# Patient Record
Sex: FEMALE | Race: WHITE | NOT HISPANIC OR LATINO | Employment: UNEMPLOYED | URBAN - METROPOLITAN AREA
[De-identification: names, ages, dates, MRNs, and addresses within clinical notes are randomized per-mention and may not be internally consistent; named-entity substitution may affect disease eponyms.]

---

## 2020-03-05 ENCOUNTER — HOSPITAL ENCOUNTER (EMERGENCY)
Facility: HOSPITAL | Age: 35
Discharge: HOME | End: 2020-03-05
Attending: EMERGENCY MEDICINE | Admitting: EMERGENCY MEDICINE

## 2020-03-05 ENCOUNTER — TELEPHONE (OUTPATIENT)
Dept: OBGYN CLINIC | Facility: MEDICAL CENTER | Age: 35
End: 2020-03-05

## 2020-03-05 ENCOUNTER — APPOINTMENT (OUTPATIENT)
Dept: LAB | Facility: HOSPITAL | Age: 35
End: 2020-03-05
Attending: OBSTETRICS & GYNECOLOGY
Payer: COMMERCIAL

## 2020-03-05 ENCOUNTER — OFFICE VISIT (OUTPATIENT)
Dept: FAMILY MEDICINE CLINIC | Facility: CLINIC | Age: 35
End: 2020-03-05

## 2020-03-05 VITALS
WEIGHT: 107 LBS | HEIGHT: 58 IN | OXYGEN SATURATION: 99 % | DIASTOLIC BLOOD PRESSURE: 60 MMHG | SYSTOLIC BLOOD PRESSURE: 100 MMHG | HEART RATE: 118 BPM | BODY MASS INDEX: 22.46 KG/M2 | TEMPERATURE: 99 F

## 2020-03-05 VITALS
BODY MASS INDEX: 22.49 KG/M2 | WEIGHT: 107.14 LBS | DIASTOLIC BLOOD PRESSURE: 65 MMHG | HEART RATE: 71 BPM | OXYGEN SATURATION: 98 % | TEMPERATURE: 98 F | SYSTOLIC BLOOD PRESSURE: 110 MMHG | RESPIRATION RATE: 18 BRPM

## 2020-03-05 DIAGNOSIS — N93.9 VAGINAL BLEEDING: Primary | ICD-10-CM

## 2020-03-05 DIAGNOSIS — Z32.01 POSITIVE PREGNANCY TEST: Primary | ICD-10-CM

## 2020-03-05 DIAGNOSIS — Z32.01 POSITIVE PREGNANCY TEST: ICD-10-CM

## 2020-03-05 DIAGNOSIS — Z32.00 POSSIBLE PREGNANCY, NOT CONFIRMED: ICD-10-CM

## 2020-03-05 PROBLEM — E03.9 ACQUIRED HYPOTHYROIDISM: Status: ACTIVE | Noted: 2020-03-05

## 2020-03-05 LAB
ABO GROUP BLD: NORMAL
AMORPH URATE CRY URNS QL MICRO: ABNORMAL /HPF
B-HCG SERPL-ACNC: <2 MIU/ML
B-HCG SERPL-ACNC: <2 MIU/ML
BACTERIA UR QL AUTO: ABNORMAL /HPF
BASOPHILS # BLD AUTO: 0.14 THOUSANDS/ΜL (ref 0–0.1)
BASOPHILS # BLD AUTO: 0.17 THOUSANDS/ΜL (ref 0–0.1)
BASOPHILS NFR BLD AUTO: 2 % (ref 0–1)
BASOPHILS NFR BLD AUTO: 2 % (ref 0–1)
BILIRUB UR QL STRIP: NEGATIVE
BLD GP AB SCN SERPL QL: NEGATIVE
CLARITY UR: ABNORMAL
COLOR UR: ABNORMAL
EOSINOPHIL # BLD AUTO: 0.76 THOUSAND/ΜL (ref 0–0.61)
EOSINOPHIL # BLD AUTO: 0.81 THOUSAND/ΜL (ref 0–0.61)
EOSINOPHIL NFR BLD AUTO: 8 % (ref 0–6)
EOSINOPHIL NFR BLD AUTO: 9 % (ref 0–6)
ERYTHROCYTE [DISTWIDTH] IN BLOOD BY AUTOMATED COUNT: 13.5 % (ref 11.6–15.1)
ERYTHROCYTE [DISTWIDTH] IN BLOOD BY AUTOMATED COUNT: 13.7 % (ref 11.6–15.1)
GLUCOSE UR STRIP-MCNC: NEGATIVE MG/DL
HCT VFR BLD AUTO: 41.5 % (ref 34.8–46.1)
HCT VFR BLD AUTO: 44.9 % (ref 34.8–46.1)
HGB BLD-MCNC: 13.6 G/DL (ref 11.5–15.4)
HGB BLD-MCNC: 14.6 G/DL (ref 11.5–15.4)
HGB UR QL STRIP.AUTO: ABNORMAL
IMM GRANULOCYTES # BLD AUTO: 0.01 THOUSAND/UL (ref 0–0.2)
IMM GRANULOCYTES # BLD AUTO: 0.03 THOUSAND/UL (ref 0–0.2)
IMM GRANULOCYTES NFR BLD AUTO: 0 % (ref 0–2)
IMM GRANULOCYTES NFR BLD AUTO: 0 % (ref 0–2)
KETONES UR STRIP-MCNC: NEGATIVE MG/DL
LEUKOCYTE ESTERASE UR QL STRIP: NEGATIVE
LYMPHOCYTES # BLD AUTO: 3.07 THOUSANDS/ΜL (ref 0.6–4.47)
LYMPHOCYTES # BLD AUTO: 4.15 THOUSANDS/ΜL (ref 0.6–4.47)
LYMPHOCYTES NFR BLD AUTO: 36 % (ref 14–44)
LYMPHOCYTES NFR BLD AUTO: 39 % (ref 14–44)
MCH RBC QN AUTO: 32.7 PG (ref 26.8–34.3)
MCH RBC QN AUTO: 32.7 PG (ref 26.8–34.3)
MCHC RBC AUTO-ENTMCNC: 32.5 G/DL (ref 31.4–37.4)
MCHC RBC AUTO-ENTMCNC: 32.8 G/DL (ref 31.4–37.4)
MCV RBC AUTO: 100 FL (ref 82–98)
MCV RBC AUTO: 101 FL (ref 82–98)
MONOCYTES # BLD AUTO: 0.56 THOUSAND/ΜL (ref 0.17–1.22)
MONOCYTES # BLD AUTO: 0.62 THOUSAND/ΜL (ref 0.17–1.22)
MONOCYTES NFR BLD AUTO: 6 % (ref 4–12)
MONOCYTES NFR BLD AUTO: 7 % (ref 4–12)
NEUTROPHILS # BLD AUTO: 3.99 THOUSANDS/ΜL (ref 1.85–7.62)
NEUTROPHILS # BLD AUTO: 4.92 THOUSANDS/ΜL (ref 1.85–7.62)
NEUTS SEG NFR BLD AUTO: 45 % (ref 43–75)
NEUTS SEG NFR BLD AUTO: 46 % (ref 43–75)
NITRITE UR QL STRIP: NEGATIVE
NON-SQ EPI CELLS URNS QL MICRO: ABNORMAL /HPF
NRBC BLD AUTO-RTO: 0 /100 WBCS
NRBC BLD AUTO-RTO: 0 /100 WBCS
PH UR STRIP.AUTO: 6 [PH] (ref 4.5–8)
PLATELET # BLD AUTO: 398 THOUSANDS/UL (ref 149–390)
PLATELET # BLD AUTO: 442 THOUSANDS/UL (ref 149–390)
PMV BLD AUTO: 9.1 FL (ref 8.9–12.7)
PMV BLD AUTO: 9.3 FL (ref 8.9–12.7)
PROT UR STRIP-MCNC: ABNORMAL MG/DL
RBC # BLD AUTO: 4.16 MILLION/UL (ref 3.81–5.12)
RBC # BLD AUTO: 4.46 MILLION/UL (ref 3.81–5.12)
RBC #/AREA URNS AUTO: ABNORMAL /HPF
RH BLD: POSITIVE
SL AMB POCT URINE HCG: NEGATIVE
SP GR UR STRIP.AUTO: 1.01 (ref 1–1.03)
SPECIMEN EXPIRATION DATE: NORMAL
UROBILINOGEN UR QL STRIP.AUTO: 0.2 E.U./DL
WBC # BLD AUTO: 10.7 THOUSAND/UL (ref 4.31–10.16)
WBC # BLD AUTO: 8.53 THOUSAND/UL (ref 4.31–10.16)
WBC #/AREA URNS AUTO: ABNORMAL /HPF

## 2020-03-05 PROCEDURE — 99284 EMERGENCY DEPT VISIT MOD MDM: CPT

## 2020-03-05 PROCEDURE — 4004F PT TOBACCO SCREEN RCVD TLK: CPT | Performed by: NURSE PRACTITIONER

## 2020-03-05 PROCEDURE — 99203 OFFICE O/P NEW LOW 30 MIN: CPT | Performed by: NURSE PRACTITIONER

## 2020-03-05 PROCEDURE — 85025 COMPLETE CBC W/AUTO DIFF WBC: CPT

## 2020-03-05 PROCEDURE — 99283 EMERGENCY DEPT VISIT LOW MDM: CPT | Performed by: EMERGENCY MEDICINE

## 2020-03-05 PROCEDURE — 84702 CHORIONIC GONADOTROPIN TEST: CPT | Performed by: EMERGENCY MEDICINE

## 2020-03-05 PROCEDURE — 85025 COMPLETE CBC W/AUTO DIFF WBC: CPT | Performed by: EMERGENCY MEDICINE

## 2020-03-05 PROCEDURE — 36415 COLL VENOUS BLD VENIPUNCTURE: CPT | Performed by: EMERGENCY MEDICINE

## 2020-03-05 PROCEDURE — 84702 CHORIONIC GONADOTROPIN TEST: CPT

## 2020-03-05 PROCEDURE — 86850 RBC ANTIBODY SCREEN: CPT

## 2020-03-05 PROCEDURE — 81025 URINE PREGNANCY TEST: CPT | Performed by: NURSE PRACTITIONER

## 2020-03-05 PROCEDURE — 3008F BODY MASS INDEX DOCD: CPT | Performed by: NURSE PRACTITIONER

## 2020-03-05 PROCEDURE — 86901 BLOOD TYPING SEROLOGIC RH(D): CPT

## 2020-03-05 PROCEDURE — 81001 URINALYSIS AUTO W/SCOPE: CPT

## 2020-03-05 PROCEDURE — 86900 BLOOD TYPING SEROLOGIC ABO: CPT

## 2020-03-05 NOTE — ED PROVIDER NOTES
History  Chief Complaint   Patient presents with    Vaginal Bleeding - Pregnant     Pt reports she is 7 weeks pregnant and c/o vaginal bleeding ongoing 2 days; denies cramping; Pt reports she is going throut one pad every 8 hours     A 28 yo female who is  at [address] by LMP (2019); presents with vaginal bleeding that began 2 days ago  Patient describes the vaginal bleeding as light, going through a menstrual pad every 8 hours  Patient denies passing blood clots or tissue  Patient denies associated abdominal pain or cramping  Patient otherwise denies fever, chills, chest pain, shortness of breath, nausea, vomiting, diarrhea, peripheral edema and rashes  Patient was seen at [facility] ED on 2020 after having a missed period, and was found to have a positive pregnancy test   Patient's quant HCG was 193 at that time however and ultrasound did not identify an IUP  Patient was instructed to have repeat lab work however did not have a completed secondary to not having an OBGYN  Patient presented to her family physician today given the bleeding, where a urine pregnancy test was negative  Patient was sent to the ED for further evaluation  Patient does have an appointment with OB tomorrow  A/P:  Vaginal bleeding, with a recent positive pregnancy test at [facility] ED last month  Patient resting comfortably no acute distress  Will check lab work for hCG levels and anemia  On review, patient is Rh positive  If patient's quant hCG is positive, will proceed with imaging  History provided by:  Patient and medical records    None       Past Medical History:   Diagnosis Date    Disease of thyroid gland        History reviewed  No pertinent surgical history      Family History   Problem Relation Age of Onset    Coronary artery disease Mother     Diabetes Mother     Heart attack Mother     No Known Problems Father     Diabetes Paternal Grandmother     Diabetes Paternal Grandfather      I have reviewed and agree with the history as documented  E-Cigarette/Vaping    E-Cigarette Use Former User      E-Cigarette/Vaping Substances     Social History     Tobacco Use    Smoking status: Current Every Day Smoker     Types: Cigarettes    Smokeless tobacco: Never Used   Substance Use Topics    Alcohol use: Not Currently     Frequency: Never    Drug use: Not Currently       Review of Systems   Genitourinary: Positive for vaginal bleeding  All other systems reviewed and are negative        Physical Exam  Physical Exam  General Appearance: alert and oriented, nad, non toxic appearing  Skin:  Warm, dry, intact  HEENT: atraumatic, normocephalic  Neck: Supple, trachea midline  Cardiac: RRR; no murmurs, rub, gallops  Pulmonary: lungs CTAB; no wheezes, rales, rhonchi  Gastrointestinal: abdomen soft, nontender, nondistended; no guarding or rebound tenderness; good bowel sounds, no mass or bruits  Extremities:  no pedal edema, 2+ pulses; no calf tenderness, no clubbing, no cyanosis  Neuro:  no focal motor or sensory deficits, CN 2-12 grossly intact  Psych:  Normal mood and affect, normal judgement and insight        Vital Signs  ED Triage Vitals   Temperature Pulse Respirations Blood Pressure SpO2   03/05/20 1013 03/05/20 1013 03/05/20 1013 03/05/20 1013 03/05/20 1013   98 °F (36 7 °C) 94 16 140/82 99 %      Temp Source Heart Rate Source Patient Position - Orthostatic VS BP Location FiO2 (%)   03/05/20 1013 03/05/20 1013 03/05/20 1211 03/05/20 1211 --   Oral Monitor Lying Right arm       Pain Score       03/05/20 1013       No Pain           Vitals:    03/05/20 1013 03/05/20 1211   BP: 140/82 110/65   Pulse: 94 71   Patient Position - Orthostatic VS:  Lying         Visual Acuity      ED Medications  Medications - No data to display    Diagnostic Studies  Results Reviewed     Procedure Component Value Units Date/Time    Urine Microscopic [[id number]]  (Abnormal) Collected:  03/05/20 1129    Lab Status:  Final result Specimen:  Urine, Clean Catch Updated:  03/05/20 1203     RBC, UA 4-10 /hpf      WBC, UA 1-2 /hpf      Epithelial Cells Occasional /hpf      Bacteria, UA Moderate /hpf      AMORPH URATES Moderate /hpf     POCT urinalysis dipstick [[id number]]  (Abnormal) Resulted:  03/05/20 1132    Lab Status:  Final result Specimen:  Urine Updated:  03/05/20 1132    Urine Macroscopic, POC [[id number]]  (Abnormal) Collected:  03/05/20 1129    Lab Status:  Final result Specimen:  Urine Updated:  03/05/20 1131     Color, UA Bloody     Clarity, UA Cloudy     pH, UA 6 0     Leukocytes, UA Negative     Nitrite, UA Negative     Protein, UA 30 (1+) mg/dl      Glucose, UA Negative mg/dl      Ketones, UA Negative mg/dl      Urobilinogen, UA 0 2 E U /dl      Bilirubin, UA Negative     Blood, UA Large     Specific [place], UA 1 010    Narrative:       CLINITEK RESULT    Quantitative hCG [[id number]]  (Normal) Collected:  03/05/20 1048    Lab Status:  Final result Specimen:  Blood from Arm, Left Updated:  03/05/20 1123     HCG, Quant <2 mIU/mL     Narrative:        Expected Ranges:     Approximate               Approximate HCG  Gestation age          Concentration ( mIU/mL)  _____________          ______________________   [facility]                      HCG values  0 2-1                       5-50  1-2                           2-3                         100-5000  3-4                         500-17460  4-5                         1000-99706  5-6                         54311-000324  6-8                         30251-360421  8-12                        [id number]      CBC and differential [[id number]]  (Abnormal) Collected:  03/05/20 1048    Lab Status:  Final result Specimen:  Blood from Arm, Left Updated:  03/05/20 1056     WBC 8 53 Thousand/uL      RBC 4 16 Million/uL      Hemoglobin 13 6 g/dL      Hematocrit 41 5 %       fL      MCH 32 7 pg      MCHC 32 8 g/dL      RDW 13 5 %      MPV 9 1 fL      Platelets 629 Thousands/uL      nRBC 0 /100 WBCs Neutrophils Relative 46 %      Immat GRANS % 0 %      Lymphocytes Relative 36 %      Monocytes Relative 7 %      Eosinophils Relative 9 %      Basophils Relative 2 %      Neutrophils Absolute 3 99 Thousands/µL      Immature Grans Absolute 0 01 Thousand/uL      Lymphocytes Absolute 3 07 Thousands/µL      Monocytes Absolute 0 56 Thousand/µL      Eosinophils Absolute 0 76 Thousand/µL      Basophils Absolute 0 14 Thousands/µL                  No orders to display              Procedures  Procedures         ED Course  ED Course as of Mar 05 1457   Thu Mar 05, 2020   1156 Suspect patient had miscarriage at some point since ED visit last month  Will proceed with discharge home, recommending follow up with OB tomorrow as previously scheduled  HCG QUANTITATIVE: <2                               MDM      Disposition  Final diagnoses:   Vaginal bleeding     Time reflects when diagnosis was documented in both MDM as applicable and the Disposition within this note     Time User Action Codes Description Comment    3/5/2020 11:57 AM Owen Michele Add [N93 9] Vaginal bleeding       ED Disposition     ED Disposition Condition Date/Time Comment    Discharge Stable Thu Mar 5, 2020 11:57 AM Sabas Guillen discharge to home/self care  Follow-up Information     Follow up With Specialties Details Why Char Webb MD Obstetrics and Gynecology, Gynecology, Obstetrics Go on 3/6/2020 For re-evaluation [address] [phone]            There are no discharge medications for this patient  No discharge procedures on file      PDMP Review     None          ED Provider  Electronically Signed by           Linda Elias DO  03/05/20 1334

## 2020-03-05 NOTE — PROGRESS NOTES
Assessment/Plan:    No problem-specific Assessment & Plan notes found for this encounter  Diagnoses and all orders for this visit:    Vaginal bleeding  Possible pregnancy, not confirmed  -     POCT urine HCG negative x 2 in office today  Unable to get pt into gyn today, but got her an appt for tomorrow  Sent to ED after this visit for eval today  Follow up with gyn pending ED recommendations  Follow up here in near future for health maintenance  Subjective:      Patient ID: Summer Blanchard is a 29 y o  female  Here to establish care; pt's mother is a patient here  Pt has been spotting x 2 days; says "it took a day and half" to soil a pad  Says she has heightened sense of small like being pregnant  Pt was seen in ED for spotting 2/5/20; U/S "did not see anything," but labs showed pregnancy  Stopped spotting after ED visit  Pt says she spotted through her 2 viable pregancies  Did miscarriage twins at 21 weeks and had D&C for nonviable pregnancy at < 20 wks of another pregnancy  LMP: 12/21/20  Pt says her previous gyn would not see her because gyn does not accept her insurance  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  Has no physical limitations at work  General health: has history of hypothyroidism, currently not on any meds  Lives with parents, 5 and 5 yo sons  Sleep: erractic sleep pattern; naps during day  Diet: trying eat fruits and vegs, tries to drink milk and cheese sticks but milk upsets her stomach  Exercise: walks, dances, sometimes yoga  Mood: occ anxiety  Last pap: 8 yrs ago              The following portions of the patient's history were reviewed and updated as appropriate: allergies, current medications, past family history, past medical history, past social history, past surgical history and problem list     Review of Systems   Constitutional: Negative for activity change, appetite change, chills, diaphoresis, fatigue and fever  HENT: Positive for congestion and rhinorrhea  Respiratory: Positive for cough  Negative for shortness of breath  Cardiovascular: Negative for chest pain, palpitations and leg swelling  Gastrointestinal: Negative for abdominal pain  Genitourinary: Positive for vaginal bleeding  Negative for dysuria and vaginal pain  Neurological: Negative for dizziness, weakness, light-headedness and headaches  Psychiatric/Behavioral: Negative for dysphoric mood  The patient is not nervous/anxious  Objective:      /60 (BP Location: Left arm, Patient Position: Sitting, Cuff Size: Adult)   Pulse (!) 118   Temp 99 °F (37 2 °C)   Ht 4' 9 87" (1 47 m)   Wt 48 5 kg (107 lb)   SpO2 99%   BMI 22 46 kg/m²          Physical Exam   Constitutional: She appears well-developed and well-nourished  HENT:   Head: Normocephalic  Right Ear: External ear normal    Left Ear: External ear normal    Nose: Nose normal    Mouth/Throat: Oropharynx is clear and moist    Eyes: Conjunctivae are normal    Neck: Normal range of motion  Neck supple  No thyromegaly present  Cardiovascular: Normal rate, regular rhythm and normal heart sounds  Pulmonary/Chest: Effort normal and breath sounds normal    Abdominal: Soft  There is no tenderness  Genitourinary:   Genitourinary Comments: Large amount carlos blood in introitus; bi-manual exam deferred to ED  Lymphadenopathy:     She has no cervical adenopathy  Skin: Skin is warm and dry  Psychiatric: She has a normal mood and affect   Her behavior is normal  Judgment and thought content normal

## 2020-03-05 NOTE — TELEPHONE ENCOUNTER
Patient was seen at her PCP today, they called us looking to schedule an appointment for the patient because of bleeding while in early pregnancy  LMP: 12/21/2019  US from 2/5/2020 in [facility]  The PCP office did do a UPT and it came back negative  They did state that that patient starting spotting 2 days ago  And started heavily bleeding today  Stated she was saturating a pad an hour  I advised the patient to go to the ED due to the heavy bleeding  Appointment scheduled for 3/6/2020 with Dr Jose L Barrientos  Blood work for CBC, T&S and HCG placed in chart and advised for patient to get it down today if possible so results are back in time for appointment tomorrow

## 2020-03-18 ENCOUNTER — TELEPHONE (OUTPATIENT)
Dept: FAMILY MEDICINE CLINIC | Facility: CLINIC | Age: 35
End: 2020-03-18

## 2020-03-18 NOTE — TELEPHONE ENCOUNTER
LMOM for patient to call office   Ins is not a valid primary insurance  Need current insurance information

## 2022-08-12 ENCOUNTER — OFFICE VISIT (OUTPATIENT)
Dept: OBGYN CLINIC | Facility: CLINIC | Age: 37
End: 2022-08-12

## 2022-08-12 ENCOUNTER — LAB (OUTPATIENT)
Dept: LAB | Facility: CLINIC | Age: 37
End: 2022-08-12

## 2022-08-12 VITALS
TEMPERATURE: 98.2 F | HEART RATE: 86 BPM | SYSTOLIC BLOOD PRESSURE: 112 MMHG | BODY MASS INDEX: 25.82 KG/M2 | HEIGHT: 58 IN | WEIGHT: 123 LBS | DIASTOLIC BLOOD PRESSURE: 68 MMHG

## 2022-08-12 DIAGNOSIS — Z3A.23 23 WEEKS GESTATION OF PREGNANCY: ICD-10-CM

## 2022-08-12 DIAGNOSIS — E03.9 HYPOTHYROIDISM AFFECTING PREGNANCY IN SECOND TRIMESTER: ICD-10-CM

## 2022-08-12 DIAGNOSIS — O09.30 LATE PRENATAL CARE: ICD-10-CM

## 2022-08-12 DIAGNOSIS — O99.282 HYPOTHYROIDISM AFFECTING PREGNANCY IN SECOND TRIMESTER: ICD-10-CM

## 2022-08-12 DIAGNOSIS — O09.299 PREGNANCY WITH POOR OBSTETRIC HISTORY: ICD-10-CM

## 2022-08-12 DIAGNOSIS — Z36.89 ESTABLISH GESTATIONAL AGE, ULTRASOUND: ICD-10-CM

## 2022-08-12 DIAGNOSIS — N91.2 AMENORRHEA: Primary | ICD-10-CM

## 2022-08-12 PROBLEM — O99.280 HYPOTHYROIDISM AFFECTING PREGNANCY: Status: ACTIVE | Noted: 2022-08-12

## 2022-08-12 LAB
ABO GROUP BLD: NORMAL
AMORPH URATE CRY URNS QL MICRO: ABNORMAL
BACTERIA UR QL AUTO: ABNORMAL /HPF
BASOPHILS # BLD AUTO: 0.08 THOUSANDS/ΜL (ref 0–0.1)
BASOPHILS NFR BLD AUTO: 1 % (ref 0–1)
BILIRUB UR QL STRIP: NEGATIVE
BLD GP AB SCN SERPL QL: NEGATIVE
CLARITY UR: ABNORMAL
COLOR UR: ABNORMAL
EOSINOPHIL # BLD AUTO: 0.43 THOUSAND/ΜL (ref 0–0.61)
EOSINOPHIL NFR BLD AUTO: 3 % (ref 0–6)
ERYTHROCYTE [DISTWIDTH] IN BLOOD BY AUTOMATED COUNT: 14.6 % (ref 11.6–15.1)
GLUCOSE UR STRIP-MCNC: NEGATIVE MG/DL
HBV SURFACE AG SER QL: NORMAL
HCT VFR BLD AUTO: 33.7 % (ref 36.5–46.1)
HCV AB SER QL: NORMAL
HGB BLD-MCNC: 10.6 G/DL (ref 12–15.4)
HGB UR QL STRIP.AUTO: NEGATIVE
IMM GRANULOCYTES # BLD AUTO: 0.07 THOUSAND/UL (ref 0–0.2)
IMM GRANULOCYTES NFR BLD AUTO: 1 % (ref 0–2)
KETONES UR STRIP-MCNC: NEGATIVE MG/DL
LEUKOCYTE ESTERASE UR QL STRIP: ABNORMAL
LYMPHOCYTES # BLD AUTO: 3.35 THOUSANDS/ΜL (ref 0.6–4.47)
LYMPHOCYTES NFR BLD AUTO: 25 % (ref 14–44)
MCH RBC QN AUTO: 32 PG (ref 26.8–34.3)
MCHC RBC AUTO-ENTMCNC: 31.5 G/DL (ref 31.4–37.4)
MCV RBC AUTO: 102 FL (ref 82–98)
MONOCYTES # BLD AUTO: 0.83 THOUSAND/ΜL (ref 0.17–1.22)
MONOCYTES NFR BLD AUTO: 6 % (ref 4–12)
NEUTROPHILS # BLD AUTO: 8.8 THOUSANDS/ΜL (ref 1.85–7.62)
NEUTS SEG NFR BLD AUTO: 64 % (ref 43–75)
NITRITE UR QL STRIP: NEGATIVE
NON-SQ EPI CELLS URNS QL MICRO: ABNORMAL /HPF
NRBC BLD AUTO-RTO: 0 /100 WBCS
PH UR STRIP.AUTO: 6 [PH]
PLATELET # BLD AUTO: 465 THOUSANDS/UL (ref 149–390)
PMV BLD AUTO: 10.7 FL (ref 8.9–12.7)
PROT UR STRIP-MCNC: NEGATIVE MG/DL
RBC # BLD AUTO: 3.31 MILLION/UL (ref 3.88–5.12)
RBC #/AREA URNS AUTO: ABNORMAL /HPF
RENAL EPI CELLS #/AREA URNS HPF: PRESENT /[HPF]
RH BLD: POSITIVE
RUBV IGG SERPL IA-ACNC: 16.9 IU/ML
SP GR UR STRIP.AUTO: 1.01 (ref 1–1.03)
SPECIMEN EXPIRATION DATE: NORMAL
T4 FREE SERPL-MCNC: 0.48 NG/DL (ref 0.76–1.46)
TRANS CELLS #/AREA URNS HPF: PRESENT /[HPF]
TSH SERPL DL<=0.05 MIU/L-ACNC: 46.4 UIU/ML (ref 0.45–4.5)
UROBILINOGEN UR STRIP-ACNC: <2 MG/DL
WBC # BLD AUTO: 13.56 THOUSAND/UL (ref 4.31–10.16)
WBC #/AREA URNS AUTO: ABNORMAL /HPF

## 2022-08-12 PROCEDURE — 84439 ASSAY OF FREE THYROXINE: CPT

## 2022-08-12 PROCEDURE — 36415 COLL VENOUS BLD VENIPUNCTURE: CPT

## 2022-08-12 PROCEDURE — 87086 URINE CULTURE/COLONY COUNT: CPT

## 2022-08-12 PROCEDURE — 76815 OB US LIMITED FETUS(S): CPT | Performed by: CLINICAL NURSE SPECIALIST

## 2022-08-12 PROCEDURE — 99213 OFFICE O/P EST LOW 20 MIN: CPT | Performed by: CLINICAL NURSE SPECIALIST

## 2022-08-12 PROCEDURE — 84443 ASSAY THYROID STIM HORMONE: CPT

## 2022-08-12 PROCEDURE — 86803 HEPATITIS C AB TEST: CPT

## 2022-08-12 PROCEDURE — 81001 URINALYSIS AUTO W/SCOPE: CPT

## 2022-08-12 PROCEDURE — 80081 OBSTETRIC PANEL INC HIV TSTG: CPT

## 2022-08-12 RX ORDER — ACETAMINOPHEN 325 MG/1
325 TABLET ORAL AS NEEDED
COMMUNITY

## 2022-08-12 NOTE — PROGRESS NOTES
Subjective  Patient ID: Jori Gregory is a 39 y o  adult here for Routine Prenatal Visit (LMP: 3/4/2022  Pre pregnancy weight 110lb )    She is C/O amenorrhea  Signs and symptoms of pregnancy:    Breast tenderness yes   Fatigue yes   Cramping or Pelvic Pain no   Spotting or Vaginal Bleeding no   Nausea or vomiting no    Patient's last menstrual period was 2022 (exact date)  giving her an KELLEY of 22 and a gestational age of 21w0d (based on LMP)    Menstrual cycle: regular, cycle length:   Pregnancy was unplanned/surpised  She has started taking a prenatal vitamin  Hx of thyroid dx- no tx x 10-11 yrs  Was on 100mcg of synthroid but lost insurance  Taking OTC thyroid supplement  OB History    Para Term  AB Living   6 2 2 0 3 2   SAB IAB Ectopic Multiple Live Births   1 2   1 2      # Outcome Date GA Lbr Kayode/2nd Weight Sex Delivery Anes PTL Lv   6 Current            5 SAB 2020 6w0d    SAB      4 IAB 2013 16w0d             Birth Comments: Multiple anomalies- reports only a head developed  D&C   3 Term 12 38w0d   M Vag-Spont EPI  PRACHI      Complications: Nuchal cord affecting delivery   2 Term 08/09/10 40w0d   M Vag-Spont EPI  PRACHI   1A IAB  21w0d   M          Birth Comments: multiple anomalies, termination D&C   1B IAB  21w0d   F          Birth Comments: multiple anomalies  termination  D&C        The following portions of the patient's history were reviewed and updated as appropriate: allergies, current medications, past family history, past medical history, past social history, past surgical history, and problem list   Perinent hx that may affect pregnancy        The following portions of the patient's history were reviewed and updated as appropriate: allergies, current medications, past family history, past medical history, past social history, past surgical history, and problem list     Review of Systems    See HPI for pertinent positives               /68 (BP Location: Right arm, Patient Position: Sitting, Cuff Size: Adult)   Pulse 86   Temp 98 2 °F (36 8 °C)   Ht 4' 10" (1 473 m)   Wt 55 8 kg (123 lb)   LMP 03/04/2022 (Exact Date)   BMI 25 71 kg/m²   OBGyn Exam      Initial OBSTETRIC ULTRASOUND  8/12/2022  Brendon Nissen Schreck, CRNP Clemente Imus is a No obstetric history on file  Patient's last menstrual period was 03/04/2022 (exact date)  INDICATION: , late presenter for prenatal care     FINDINGS:  A single intrauterine gestation is identified  Gestational Sac: Present    Yolk Sac:  not visualized  Cardiac activity is detected at 149  HC-19 09 cm = 21w2d  AC-16 95cm =22w0d  FL-3 16 cm = 20w6d  Adnexa:  No adnexal mass or pathologic cyst   Cul de Sac:  No significant free fluid identified     IMPRESSION:  Single intrauterine pregnancy of 21 weeks 0days gestational age  Fetal cardiac activity detected  No adnexal masses seen  EDC by LMP: 12/9/22  EDC by this Ultrasound: 12/23/22  Formal dating to be done by [facility]      Ultrasound Probe Disinfection    A transvaginal ultrasound was performed  Prior to use, disinfection was performed with High Level Disinfection Process ([employer])  Probe serial number F: [id number] was used  Assessment/Plan:       She is a [id number] who is 23w0d by LMP   US showing a viable IUP  That is measuring 14 days behind  NO PNC till now  Formal US to be done by [facility] asap  RTO for OB intake and PNexam asap as well    Prenatal lab work ordered    Criselda Beth Nutrition, Immunizations, and Medications during pregnancy   Warning Signs During Pregnancy   [address] and Ultrasounds in Pregnancy        Problem List Items Addressed This Visit        Pregnancy G6    Hypothyroidism affecting pregnancy    Late prenatal care    Pregnancy with poor obstetric history      Other Visit Diagnoses     Amenorrhea    -  Primary    Establish gestational age, ultrasound        Relevant Orders    Ambulatory Referral to Maternal Fetal Medicine    23 weeks gestation of pregnancy        Relevant Orders    TSH, 3rd generation with Free T4 reflex    Prenatal Panel    Hepatitis C antibody    Ambulatory Referral to Maternal Fetal Medicine          Orders Placed This Encounter   Procedures    TSH, 3rd generation with Free T4 reflex    Hepatitis C antibody    Ambulatory Referral to Maternal Fetal Medicine

## 2022-08-14 LAB
BACTERIA UR CULT: NORMAL
HIV 1+2 AB+HIV1 P24 AG SERPL QL IA: NORMAL
RPR SER QL: NORMAL

## 2022-08-15 ENCOUNTER — TELEPHONE (OUTPATIENT)
Dept: PERINATAL CARE | Facility: OTHER | Age: 37
End: 2022-08-15

## 2022-08-15 NOTE — TELEPHONE ENCOUNTER
Pt returned my phone call regarding her level 2 on 8/16 that I schelduled in [place] she at first wanted to cancel this appt due to being self pay and that she cannot afford to pay for her visit at the time of the appt, I did ask her if she applied for medical assistance and she is in the process of applying I did explain to her that she will not have to pay at the time of her visit, she will have to sign a self pay estimate waiver and that medical assistance will back date 90 days, she felt a lot better after I told her that and then she decided to keep this appt

## 2022-08-16 ENCOUNTER — ROUTINE PRENATAL (OUTPATIENT)
Dept: PERINATAL CARE | Facility: OTHER | Age: 37
End: 2022-08-16

## 2022-08-16 VITALS
SYSTOLIC BLOOD PRESSURE: 115 MMHG | WEIGHT: 122.8 LBS | BODY MASS INDEX: 25.78 KG/M2 | DIASTOLIC BLOOD PRESSURE: 77 MMHG | HEIGHT: 58 IN | HEART RATE: 89 BPM

## 2022-08-16 DIAGNOSIS — O35.2XX0 PREVIOUS CHILD WITH CONGENITAL ANOMALY, CURRENTLY PREGNANT, ANTEPARTUM, SINGLE OR UNSPECIFIED FETUS: ICD-10-CM

## 2022-08-16 DIAGNOSIS — O09.32 LATE PRENATAL CARE AFFECTING PREGNANCY IN SECOND TRIMESTER: ICD-10-CM

## 2022-08-16 DIAGNOSIS — O09.522 ELDERLY MULTIGRAVIDA, SECOND TRIMESTER: Primary | ICD-10-CM

## 2022-08-16 DIAGNOSIS — Z36.89 ESTABLISH GESTATIONAL AGE, ULTRASOUND: ICD-10-CM

## 2022-08-16 DIAGNOSIS — O99.282 HYPOTHYROIDISM AFFECTING PREGNANCY IN SECOND TRIMESTER: Primary | ICD-10-CM

## 2022-08-16 DIAGNOSIS — O99.282 HYPOTHYROIDISM DURING PREGNANCY IN SECOND TRIMESTER: ICD-10-CM

## 2022-08-16 DIAGNOSIS — E03.9 HYPOTHYROIDISM DURING PREGNANCY IN SECOND TRIMESTER: ICD-10-CM

## 2022-08-16 DIAGNOSIS — E03.9 HYPOTHYROIDISM AFFECTING PREGNANCY IN SECOND TRIMESTER: Primary | ICD-10-CM

## 2022-08-16 DIAGNOSIS — Z36.86 ENCOUNTER FOR ANTENATAL SCREENING FOR CERVICAL LENGTH: ICD-10-CM

## 2022-08-16 DIAGNOSIS — Z3A.22 22 WEEKS GESTATION OF PREGNANCY: ICD-10-CM

## 2022-08-16 DIAGNOSIS — O35.8XX0 PREGNANCY COMPLICATED BY FETAL ABDOMINAL ABNORMALITY, SINGLE OR UNSPECIFIED FETUS: ICD-10-CM

## 2022-08-16 PROCEDURE — 76817 TRANSVAGINAL US OBSTETRIC: CPT | Performed by: OBSTETRICS & GYNECOLOGY

## 2022-08-16 PROCEDURE — 99243 OFF/OP CNSLTJ NEW/EST LOW 30: CPT | Performed by: OBSTETRICS & GYNECOLOGY

## 2022-08-16 PROCEDURE — 36415 COLL VENOUS BLD VENIPUNCTURE: CPT | Performed by: OBSTETRICS & GYNECOLOGY

## 2022-08-16 PROCEDURE — 76811 OB US DETAILED SNGL FETUS: CPT | Performed by: OBSTETRICS & GYNECOLOGY

## 2022-08-16 RX ORDER — LEVOTHYROXINE SODIUM 88 UG/1
88 TABLET ORAL DAILY
Qty: 30 TABLET | Refills: 2 | Status: SHIPPED | OUTPATIENT
Start: 2022-08-16 | End: 2022-09-19

## 2022-08-16 NOTE — LETTER
August 17, 2022     Korey Chen, [address] [place], [place] [address] [facility]    Patient: Isra Skelton   YOB: 1985   Date of Visit: 8/16/2022       Dear Dr Mandy Shields: Thank you for referring Isra Skelton to me for evaluation  Below are my notes for this consultation  If you have questions, please do not hesitate to call me  I look forward to following your patient along with you  Sincerely,        Marissa Mireles MD        CC: No Recipients  Marissa Mireles MD  8/15/2022  6:56 PM  Sign when Signing Visit  Please refer to the [facility] ultrasound report in Ob Procedures for additional information regarding today's visit

## 2022-08-16 NOTE — PROGRESS NOTES
Patient chose to have [facility] Non-invasive Prenatal Screen  Patient given brochure and is aware [facility] will contact patients insurance and coordinate coverage  Patient made aware she will need to respond to text message or e-mail from [employer] within 2 business days or testing will be run through insurance  Patient informed text message will come from area code  "415"  Provided [address] [place] [address] Street # [phone] and web site : [email or web address]     2 vials of blood drawn from right arm, patient tolerated blood draw without difficulty  Specimens labeled with patient identifiers (name, date of birth, specimen collection date), order and specimen was verified with patient, packed and sent via [employer]  Copy of lab order scanned to Epic media  Maternal Fetal Medicine will have results in approximately 7-10 business days and will call patient or notify via [employer]  Patient aware viewing lab result online will reveal fetal sex If ordered  Patient verbalized understanding of all instructions and no questions at this time

## 2022-08-16 NOTE — PROGRESS NOTES
Ultrasound Probe Disinfection    A transvaginal ultrasound was performed  Prior to use, disinfection was performed with High Level Disinfection Process ([employer])  Probe serial number F3: [id number] was used        Tito Mandujano MS  08/16/22  2:56 PM

## 2022-08-16 NOTE — RESULT ENCOUNTER NOTE
Will restart at [facility] and recheck in 4 wks     Referral to endo as well  Pt notified via phone  Please mail referral to pt as well as rpt TSH

## 2022-08-17 ENCOUNTER — TELEPHONE (OUTPATIENT)
Dept: PERINATAL CARE | Facility: CLINIC | Age: 37
End: 2022-08-17

## 2022-08-17 ENCOUNTER — OB ABSTRACT (OUTPATIENT)
Dept: OBGYN CLINIC | Facility: CLINIC | Age: 37
End: 2022-08-17

## 2022-08-17 PROBLEM — O35.8XX0 PREGNANCY COMPLICATED BY FETAL ABDOMINAL ABNORMALITY: Status: ACTIVE | Noted: 2022-08-17

## 2022-08-22 ENCOUNTER — TELEPHONE (OUTPATIENT)
Dept: LABOR AND DELIVERY | Facility: HOSPITAL | Age: 37
End: 2022-08-22

## 2022-08-22 DIAGNOSIS — O99.282 HYPOTHYROIDISM AFFECTING PREGNANCY IN SECOND TRIMESTER: Primary | ICD-10-CM

## 2022-08-22 DIAGNOSIS — Z36.1 NEED FOR MATERNAL SERUM ALPHA-PROTEIN (MSAFP) SCREENING: ICD-10-CM

## 2022-08-22 DIAGNOSIS — E03.9 HYPOTHYROIDISM AFFECTING PREGNANCY IN SECOND TRIMESTER: Primary | ICD-10-CM

## 2022-08-22 NOTE — TELEPHONE ENCOUNTER
----- Message from Diana Singletary RN sent at 8/22/2022  1:06 PM EDT -----  Dear Dr Phi Ly,  This NIPS result is low-risk, and patient has been advised via [employer]  Thank you    Diana Singletary RN

## 2022-08-23 ENCOUNTER — TELEPHONE (OUTPATIENT)
Dept: OBGYN CLINIC | Facility: CLINIC | Age: 37
End: 2022-08-23

## 2022-08-23 NOTE — TELEPHONE ENCOUNTER
Abel Gomez from [facility] called stating that pt has an appt w/them this week, and is requesting OV notes, BW & imaging to be faxed to [phone]  Faxed today

## 2022-08-24 ENCOUNTER — TELEPHONE (OUTPATIENT)
Dept: OBGYN CLINIC | Facility: CLINIC | Age: 37
End: 2022-08-24

## 2022-08-24 NOTE — TELEPHONE ENCOUNTER
Isha from [facility], genetic counselor is calling in regards to getting records sent over for carrier screening and afp  Patient has no records of completing either of the lab work  She does have an AFP ordered to complete  Spoke with randy as well from [facility], looking for prenatal summary  Did state her appt was cancelled due to having appt in [facility] on Friday  She was to do her intake and that would entail the documentation they would be looking for  Will fax over ob history from pregnancy confirmation appt

## 2022-08-26 ENCOUNTER — TELEPHONE (OUTPATIENT)
Dept: PERINATAL CARE | Facility: CLINIC | Age: 37
End: 2022-08-26

## 2022-08-26 NOTE — TELEPHONE ENCOUNTER
Received a phone call today from [facility] a nurse from [facility] for fetal diagnosis [place],[phone] pt was notified on 8/25 x3 for her appt that was scheluled for 8/26, pt never returned the phone call so the appt was canceled   She was reschelduled for Sept 9

## 2022-08-31 PROBLEM — Z3A.25 25 WEEKS GESTATION OF PREGNANCY: Status: ACTIVE | Noted: 2022-08-31

## 2022-09-02 ENCOUNTER — ROUTINE PRENATAL (OUTPATIENT)
Dept: OBGYN CLINIC | Facility: CLINIC | Age: 37
End: 2022-09-02

## 2022-09-02 VITALS
HEIGHT: 58 IN | BODY MASS INDEX: 26.24 KG/M2 | WEIGHT: 125 LBS | DIASTOLIC BLOOD PRESSURE: 62 MMHG | HEART RATE: 94 BPM | SYSTOLIC BLOOD PRESSURE: 110 MMHG

## 2022-09-02 DIAGNOSIS — O35.8XX0 PREGNANCY COMPLICATED BY FETAL ABDOMINAL ABNORMALITY, SINGLE OR UNSPECIFIED FETUS: ICD-10-CM

## 2022-09-02 DIAGNOSIS — E03.9 HYPOTHYROIDISM AFFECTING PREGNANCY IN SECOND TRIMESTER: Primary | ICD-10-CM

## 2022-09-02 DIAGNOSIS — Z3A.25 25 WEEKS GESTATION OF PREGNANCY: ICD-10-CM

## 2022-09-02 DIAGNOSIS — O99.282 HYPOTHYROIDISM AFFECTING PREGNANCY IN SECOND TRIMESTER: Primary | ICD-10-CM

## 2022-09-02 DIAGNOSIS — O09.522 ELDERLY MULTIGRAVIDA, SECOND TRIMESTER: ICD-10-CM

## 2022-09-02 LAB
SL AMB  POCT GLUCOSE, UA: NEGATIVE
SL AMB POCT URINE PROTEIN: NEGATIVE

## 2022-09-02 PROCEDURE — PNV: Performed by: OBSTETRICS & GYNECOLOGY

## 2022-09-02 PROCEDURE — 81002 URINALYSIS NONAUTO W/O SCOPE: CPT | Performed by: OBSTETRICS & GYNECOLOGY

## 2022-09-02 NOTE — PATIENT INSTRUCTIONS
Pregnancy at 23 to 26 [address]:   You are now close to or at the beginning of the third trimester  The third trimester starts at 24 weeks and ends with delivery  As your baby gets larger, you may develop certain symptoms  These may include pain in your back or down the sides of your abdomen  You may also have stretch marks on your abdomen, breasts, thighs, or buttocks  You may also have constipation  DISCHARGE INSTRUCTIONS:   Return to the emergency department if:   You develop a severe headache that does not go away  You have new or increased vision changes, such as blurred or spotted vision  You have new or increased swelling in your face or hands  You have vaginal spotting or bleeding  Your water broke or you feel warm water gushing or trickling from your vagina  Call your doctor or obstetrician if:   You have abdominal cramps, pressure, or tightening  You have a change in vaginal discharge  You have light bleeding  You have chills or a fever  You have vaginal itching, burning, or pain  You have yellow, green, white, or foul-smelling vaginal discharge  You have pain or burning when you urinate, less urine than usual, or pink or bloody urine  You have questions or concerns about your condition or care  How to care for yourself at this stage of your pregnancy:       Eat a variety of healthy foods  Healthy foods include fruits, vegetables, whole-grain breads, low-fat dairy foods, beans, lean meats, and fish  Drink liquids as directed  Ask how much liquid to drink each day and which liquids are best for you  Limit caffeine to less than 200 milligrams each day  Limit your intake of fish to 2 servings each week  Choose fish low in mercury such as canned light tuna, shrimp, salmon, cod, or tilapia  Do not  eat fish high in mercury such as swordfish, tilefish, tye mackerel, and shark  Manage back pain    Do not stand for long periods of time or lift heavy items  Use good posture while you stand, squat, or bend  Wear low-heeled shoes with good support  Rest may also help to relieve back pain  Ask your healthcare provider about exercises you can do to strengthen your back muscles  Take prenatal vitamins as directed  Your need for certain vitamins and minerals, such as folic acid, increases during pregnancy  Prenatal vitamins provide some of the extra vitamins and minerals you need  Prenatal vitamins may also help to decrease the risk of certain birth defects  Talk to your healthcare provider about exercise  Moderate exercise can help you stay fit  Your healthcare provider will help you plan an exercise program that is safe for you during pregnancy  Do not smoke  Smoking increases your risk of a miscarriage and other health problems during your pregnancy  Smoking can cause your baby to be born too early or weigh less at birth  Ask your healthcare provider for information if you need help quitting  Do not drink alcohol  Alcohol passes from your body to your baby through the placenta  It can affect your baby's brain development and cause fetal alcohol syndrome (FAS)  FAS is a group of conditions that causes mental, behavior, and growth problems  Talk to your healthcare provider before you take any medicines  Many medicines may harm your baby if you take them when you are pregnant  Do not take any medicines, vitamins, herbs, or supplements without first talking to your healthcare provider  Never use illegal or street drugs (such as marijuana or cocaine) while you are pregnant  Safety tips:   Avoid hot tubs and saunas  Do not use a hot tub or sauna while you are pregnant, especially during your first trimester  Hot tubs and saunas may raise your baby's temperature and increase the risk of birth defects  Avoid toxoplasmosis  This is an infection caused by eating raw meat or being around infected cat feces   It can cause birth defects, miscarriages, and other problems  Wash your hands after you touch raw meat  Make sure any meat is well-cooked before you eat it  Avoid raw eggs and unpasteurized milk  Use gloves or ask someone else to clean your cat's litter box while you are pregnant  Changes that are happening with your baby:  By 26 weeks, your baby will weigh about 2 pounds  Your baby will be about 10 inches long from the top of the head to the rump (baby's bottom)  Your baby's movements are much stronger now  Your baby's eyes are almost completely formed and can partially open  Your baby also sleeps and wakes up  What you need to know about prenatal care: Your healthcare provider will check your blood pressure and weight  You may also need the following:  A urine test  may also be done to check for sugar and protein  These can be signs of gestational diabetes or infection  Protein in your urine may also be a sign of preeclampsia  Preeclampsia is a condition that can develop during week 20 or later of your pregnancy  It causes high blood pressure, and it can cause problems with your kidneys and other organs  A gestational diabetes screen  may be done  Your healthcare provider may order either a 1-step or 2-step oral glucose tolerance test (OGTT)  1-step OGTT:  Your blood sugar level will be tested after you have not eaten for 8 hours (fasting)  You will then be given a glucose drink  Your level will be tested again 1 hour and 2 hours after you finish the drink  2-step OGTT:  You do not have to fast for the first part of the test  You will have the glucose drink at any time of day  Your blood sugar level will be checked 1 hour later  If your blood sugar is higher than a certain level, another test will be ordered  You will fast and your blood sugar level will be tested  You will have the glucose drink  Your blood will be tested again 1 hour, 2 hours, and 3 hours after you finish the glucose drink      Fundal height  is a measurement of your uterus to check your baby's growth  This number is usually the same as the number of weeks that you have been pregnant  Your baby's heart rate  will be checked  Follow up with your doctor or obstetrician as directed:  Write down your questions so you remember to ask them during your visits  © [employer] 2022 Information is for End User's use only and may not be sold, redistributed or otherwise used for commercial purposes  All illustrations and images included in CareNotes® are the copyrighted property of [employer]  or [facility] Domo Lamas   The above information is an  only  It is not intended as medical advice for individual conditions or treatments  Talk to your doctor, nurse or pharmacist before following any medical regimen to see if it is safe and effective for you

## 2022-09-02 NOTE — PROGRESS NOTES
OB/GYN  PN Visit  Erick Guillen  [id number]  2022  10:14 AM  Dr Beba Somers MD    S: 39 y o  [id number] 25w2d here for PN visit  Chief Complaint   Patient presents with    Routine Prenatal Visit         OB complaints:  Contractions: no  Leakage: no  Bleeding: no  Fetal movement: yes      O:  /62 (BP Location: Right arm, Patient Position: Sitting, Cuff Size: Adult)   Pulse 94   Ht 4' 10" (1 473 m)   Wt 56 7 kg (125 lb)   LMP  (LMP Unknown)   BMI 26 13 kg/m²       Review of Systems   Constitutional: Negative  HENT: Negative  Eyes: Negative  Respiratory: Negative  Cardiovascular: Negative  Gastrointestinal: Negative  Endocrine: Negative  Genitourinary:        As noted in HPI   Musculoskeletal: Negative  Skin: Negative  Allergic/Immunologic: Negative  Neurological: Negative  Hematological: Negative  Psychiatric/Behavioral: Negative  Physical Exam  Constitutional:       General: Erick Guillen is not in acute distress  Appearance: Erick Guillen is well-developed  Abdominal:      Palpations: Abdomen is soft  Tenderness: There is no abdominal tenderness  There is no guarding  Neurological:      Mental Status: Erick Guillen is alert and oriented to person, place, and time  Skin:     General: Skin is warm and dry     Psychiatric:         Behavior: Behavior normal              Pregravid Weight/BMI: 49 9 kg (110 lb) (BMI 23 00)  Current Weight: 56 7 kg (125 lb)   Total Weight Gain: 6 804 kg (15 lb)   Pre-Nicky Vitals    Flowsheet Row Most Recent Value   Prenatal Assessment    Fetal Heart Rate 158   Fundal Height (cm) 26 cm   Movement Present   Prenatal Vitals    Blood Pressure 110/62   Weight - Scale 56 7 kg (125 lb)   Urine Albumin/Glucose    Dilation/Effacement/Station    Vaginal Drainage    Edema            Problem List        Endocrine    Acquired hypothyroidism    Hypothyroidism affecting pregnancy    Overview     - TSH 45- restarted at 88mcg   Rpt TSH 4 wks  Referred to endo            Other    Late prenatal care    Overview     1st appt at 23 wks         Pregnancy with poor obstetric history    Previous child with congenital anomaly, currently pregnant, antepartum    Elderly multigravida, second trimester    Pregnancy complicated by fetal abdominal abnormality    Overview     Liver cystic mass - appt at [facility] on 22         25 weeks gestation of pregnancy    Overview     Declines flu vaccine and COVID vaccine  Agrees to TDAP              Discussed with patient immunizations and pregnancy  Patient is due to see [facility] due to fetal anomaly  She does have an MFM appointment in several weeks    Follow-up in 3 weeks for routine OB visit  24-28 week labs ordered    She is also due to see endocrine due to hypothyroidism    Future Appointments   Date Time Provider Damaso Rothman   2022  1:00 PM Nilda Garland MD [facility]   2022 10:30 AM   [address]   2022 10:30 AM Giuseppe Granger MD [facility]               Giuseppe Granger MD  2022  10:14 AM

## 2022-09-06 ENCOUNTER — CONSULT (OUTPATIENT)
Dept: ENDOCRINOLOGY | Facility: CLINIC | Age: 37
End: 2022-09-06

## 2022-09-06 VITALS
SYSTOLIC BLOOD PRESSURE: 100 MMHG | DIASTOLIC BLOOD PRESSURE: 62 MMHG | HEIGHT: 58 IN | WEIGHT: 125 LBS | HEART RATE: 85 BPM | BODY MASS INDEX: 26.24 KG/M2

## 2022-09-06 DIAGNOSIS — O99.282 HYPOTHYROIDISM AFFECTING PREGNANCY IN SECOND TRIMESTER: Primary | ICD-10-CM

## 2022-09-06 DIAGNOSIS — Z3A.26 26 WEEKS GESTATION OF PREGNANCY: ICD-10-CM

## 2022-09-06 DIAGNOSIS — E03.9 HYPOTHYROIDISM AFFECTING PREGNANCY IN SECOND TRIMESTER: Primary | ICD-10-CM

## 2022-09-06 PROCEDURE — 99204 OFFICE O/P NEW MOD 45 MIN: CPT | Performed by: INTERNAL MEDICINE

## 2022-09-06 NOTE — PATIENT INSTRUCTIONS
Take your levothyroxine on an empty stomach at least 30 minutes (preferably 1 hour) before food and other medications  Preferably 4 hours or more from multivitamins or supplements containing calcium, iron, magnesium

## 2022-09-06 NOTE — PROGRESS NOTES
New Patient Progress Note    CC: hypothyroidism    History of Present Illness:   38 yo female presenting for endocrinology evaluation for hypothyroidism in pregnancy  She reports she is 26 weeks pregnant  TSH was checked 8/12 and was 46 4 with Free T4 0 48  She was started on levothyroxine by ObGyn at 88mcg on 8/16 and has been taking it in the morning daily since then without missing doses  She does report she was unsure about correct administration instructions and so has been taking it with coffee with creamer some days but also after eating on other days  She had hypothyroidism since the age of 8, initially on Synthroid until she came off her fathers insurance after high school and then switched to levothyroxine  She reports being off of levothyroxine previously when she lost insurance coverage  She reports being on levothyroxine for her second pregnancy but then put it on a back burner with other costs  Oldest son is17 years old and youngest is 8years old  Reports she did have pregnancy with twins prior to oldest son, 21 5 weeks with anomolies/deformities and had D&C  After youngest son, she also also had pregnancy another with other deformities, and another natural early term miscarriage in 2020  Besides pregnancy with youngest son she has not been on levothyroxine for other pregnancies  Has appt with [facility] for liver lesion noted on current pregnancy  Previously was off of levothyroxine for about 10 years  Reports her highest dose in the past was 100mcg but thinks she was on 75mcg during prior pregnancy  No History of external radiation, recent Iodine loading or radiological diagnostic studies  No difficulty with swallowing or breathing or change in voice  Past medical history includes anemia previously on iron supplementation and hx of migraines  Reports mother has DM, migraines, hypothyroidism  Father had MI  She reports she is due between December 14-23      Regarding gender identity, she states she uses any pronoun including she/her but reports mind/thinking is male  Patient Active Problem List   Diagnosis    Acquired hypothyroidism    Late prenatal care    Pregnancy with poor obstetric history    Hypothyroidism affecting pregnancy    Previous child with congenital anomaly, currently pregnant, antepartum    Elderly multigravida, second trimester    Pregnancy complicated by fetal abdominal abnormality    25 weeks gestation of pregnancy     Past Medical History:   Diagnosis Date    Anemia     Disease of thyroid gland     Potassium (K) deficiency       History reviewed  No pertinent surgical history  Family History   Problem Relation Age of Onset    Coronary artery disease Mother     Diabetes Mother     Heart attack Mother     No Known Problems Father     Diabetes Paternal Grandmother     Diabetes Paternal Grandfather      Social History     Tobacco Use    Smoking status: Current Every Day Smoker     Types: Cigarettes    Smokeless tobacco: Never Used    Tobacco comment: one pack a week   Substance Use Topics    Alcohol use: Not Currently     Allergies   Allergen Reactions    Latex      Review of Systems   Constitutional: Positive for fatigue (reports this has been with pregnancy)  Negative for unexpected weight change  HENT: Negative for trouble swallowing and voice change  Cardiovascular: Negative for palpitations  Gastrointestinal: Positive for constipation (worse during pregnancy) and diarrhea (worse during pregnancy)  Endocrine: Negative for cold intolerance and heat intolerance  Skin:        Denies increased sweating or changes in hair or skin   Psychiatric/Behavioral:        Worsening mood swings/emotions since restarting levothyroxine   All other systems reviewed and are negative        Current Outpatient Medications:     acetaminophen (TYLENOL) 325 mg tablet, Take 325 mg by mouth as needed for mild pain, Disp: , Rfl:     levothyroxine (Euthyrox) 88 mcg tablet, Take 1 tablet (88 mcg total) by mouth daily, Disp: 30 tablet, Rfl: 2    Prenatal Vit-Iron Carbonyl-FA (prenatal multivitamin) TABS, Take 1 tablet by mouth daily, Disp: , Rfl:     Physical Exam:  Body mass index is 26 13 kg/m²  /62   Pulse 85   Ht 4' 10" (1 473 m)   Wt 56 7 kg (125 lb)   LMP  (LMP Unknown)   BMI 26 13 kg/m²        Physical Exam  Vitals reviewed  Constitutional:       Appearance: Antonia Castillo is well-developed  HENT:      Head: Normocephalic and atraumatic  Eyes:      General:         Right eye: No discharge  Left eye: No discharge  Cardiovascular:      Rate and Rhythm: Normal rate and regular rhythm  Heart sounds: Normal heart sounds  No murmur heard  No friction rub  No gallop  Pulmonary:      Effort: Pulmonary effort is normal  No respiratory distress  Breath sounds: Normal breath sounds  No wheezing or rales  Chest:      Chest wall: No tenderness  Abdominal:      General: Bowel sounds are normal  There is no distension  Palpations: Abdomen is soft  There is no mass  Tenderness: There is no abdominal tenderness  Musculoskeletal:         General: Normal range of motion  Cervical back: Normal range of motion and neck supple  Skin:     General: Skin is warm and dry  Neurological:      Mental Status: Antonia Castillo is alert and oriented to person, place, and time  Psychiatric:         Behavior: Behavior normal        Labs:     Lab Results   Component Value Date    [id number] 46 400 (H) 08/12/2022     T4 0 48    Impression:  1  Hypothyroidism affecting pregnancy in second trimester    2  26 weeks gestation of pregnancy       Plan:    Diagnoses and all orders for this visit:    Hypothyroidism affecting pregnancy in second trimester, 26 weeks gestation  -     Ambulatory Referral to Endocrinology  Recheck TSH and T4 now, continue 88mcg levothyroxine  Goal normal TSH    Plan for repeat in 2-4 weeks and close followup during pregnancy  Follow up in office in January 2023  I have spent 45 minutes with patient today in which greater than 50% of this time was spent in counseling/coordination of care  All questioned fully answered  Bethanie Winters will call me if any problems arise  Educated/ Counseled patient on diagnostic test results, prognosis, risk vs benefit of treatment options, importance of treatment compliance, healthy life and lifestyle choices

## 2022-09-16 ENCOUNTER — LAB (OUTPATIENT)
Dept: LAB | Facility: CLINIC | Age: 37
End: 2022-09-16
Payer: COMMERCIAL

## 2022-09-16 DIAGNOSIS — E03.9 HYPOTHYROIDISM AFFECTING PREGNANCY IN SECOND TRIMESTER: ICD-10-CM

## 2022-09-16 DIAGNOSIS — Z36.1 NEED FOR MATERNAL SERUM ALPHA-PROTEIN (MSAFP) SCREENING: ICD-10-CM

## 2022-09-16 DIAGNOSIS — Z3A.25 25 WEEKS GESTATION OF PREGNANCY: ICD-10-CM

## 2022-09-16 DIAGNOSIS — O99.282 HYPOTHYROIDISM AFFECTING PREGNANCY IN SECOND TRIMESTER: ICD-10-CM

## 2022-09-16 LAB
BASOPHILS # BLD AUTO: 0.11 THOUSANDS/ΜL (ref 0–0.1)
BASOPHILS NFR BLD AUTO: 1 % (ref 0–1)
EOSINOPHIL # BLD AUTO: 0.5 THOUSAND/ΜL (ref 0–0.61)
EOSINOPHIL NFR BLD AUTO: 4 % (ref 0–6)
ERYTHROCYTE [DISTWIDTH] IN BLOOD BY AUTOMATED COUNT: 15.9 % (ref 11.6–15.1)
GLUCOSE 1H P 50 G GLC PO SERPL-MCNC: 170 MG/DL (ref 40–134)
HCT VFR BLD AUTO: 36.3 % (ref 36.5–46.1)
HGB BLD-MCNC: 11.4 G/DL (ref 12–15.4)
IMM GRANULOCYTES # BLD AUTO: 0.12 THOUSAND/UL (ref 0–0.2)
IMM GRANULOCYTES NFR BLD AUTO: 1 % (ref 0–2)
LYMPHOCYTES # BLD AUTO: 2.94 THOUSANDS/ΜL (ref 0.6–4.47)
LYMPHOCYTES NFR BLD AUTO: 22 % (ref 14–44)
MCH RBC QN AUTO: 32.6 PG (ref 26.8–34.3)
MCHC RBC AUTO-ENTMCNC: 31.4 G/DL (ref 31.4–37.4)
MCV RBC AUTO: 104 FL (ref 82–98)
MONOCYTES # BLD AUTO: 0.7 THOUSAND/ΜL (ref 0.17–1.22)
MONOCYTES NFR BLD AUTO: 5 % (ref 4–12)
NEUTROPHILS # BLD AUTO: 8.92 THOUSANDS/ΜL (ref 1.85–7.62)
NEUTS SEG NFR BLD AUTO: 67 % (ref 43–75)
NRBC BLD AUTO-RTO: 0 /100 WBCS
PLATELET # BLD AUTO: 406 THOUSANDS/UL (ref 149–390)
PMV BLD AUTO: 10.6 FL (ref 8.9–12.7)
RBC # BLD AUTO: 3.5 MILLION/UL (ref 3.88–5.12)
T4 FREE SERPL-MCNC: 0.71 NG/DL (ref 0.76–1.46)
T4 FREE SERPL-MCNC: 0.73 NG/DL (ref 0.76–1.46)
TSH SERPL DL<=0.05 MIU/L-ACNC: 7.33 UIU/ML (ref 0.45–4.5)
WBC # BLD AUTO: 13.29 THOUSAND/UL (ref 4.31–10.16)

## 2022-09-16 PROCEDURE — 84443 ASSAY THYROID STIM HORMONE: CPT

## 2022-09-16 PROCEDURE — 82105 ALPHA-FETOPROTEIN SERUM: CPT

## 2022-09-16 PROCEDURE — 84439 ASSAY OF FREE THYROXINE: CPT

## 2022-09-16 PROCEDURE — 85025 COMPLETE CBC W/AUTO DIFF WBC: CPT

## 2022-09-16 PROCEDURE — 82950 GLUCOSE TEST: CPT

## 2022-09-16 PROCEDURE — 86592 SYPHILIS TEST NON-TREP QUAL: CPT

## 2022-09-16 PROCEDURE — 36415 COLL VENOUS BLD VENIPUNCTURE: CPT

## 2022-09-17 LAB — RPR SER QL: NORMAL

## 2022-09-19 ENCOUNTER — TELEPHONE (OUTPATIENT)
Dept: OBGYN CLINIC | Facility: CLINIC | Age: 37
End: 2022-09-19

## 2022-09-19 ENCOUNTER — ULTRASOUND (OUTPATIENT)
Dept: PERINATAL CARE | Facility: CLINIC | Age: 37
End: 2022-09-19
Payer: COMMERCIAL

## 2022-09-19 VITALS
WEIGHT: 129 LBS | DIASTOLIC BLOOD PRESSURE: 68 MMHG | HEART RATE: 100 BPM | HEIGHT: 58 IN | SYSTOLIC BLOOD PRESSURE: 112 MMHG | BODY MASS INDEX: 27.08 KG/M2

## 2022-09-19 DIAGNOSIS — E03.9 HYPOTHYROIDISM AFFECTING PREGNANCY IN SECOND TRIMESTER: Primary | ICD-10-CM

## 2022-09-19 DIAGNOSIS — E07.9 THYROID DISEASE AFFECTING PREGNANCY: ICD-10-CM

## 2022-09-19 DIAGNOSIS — O99.280 THYROID DISEASE AFFECTING PREGNANCY: ICD-10-CM

## 2022-09-19 DIAGNOSIS — O99.282 HYPOTHYROIDISM AFFECTING PREGNANCY IN SECOND TRIMESTER: Primary | ICD-10-CM

## 2022-09-19 DIAGNOSIS — Z36.89 ENCOUNTER FOR ULTRASOUND TO CHECK FETAL GROWTH: Primary | ICD-10-CM

## 2022-09-19 DIAGNOSIS — O35.8XX0 PREGNANCY COMPLICATED BY FETAL ABDOMINAL ABNORMALITY, SINGLE OR UNSPECIFIED FETUS: ICD-10-CM

## 2022-09-19 DIAGNOSIS — O99.810 ABNORMAL GLUCOSE TOLERANCE IN PREGNANCY: Primary | ICD-10-CM

## 2022-09-19 DIAGNOSIS — Z3A.27 27 WEEKS GESTATION OF PREGNANCY: ICD-10-CM

## 2022-09-19 PROCEDURE — 76816 OB US FOLLOW-UP PER FETUS: CPT | Performed by: STUDENT IN AN ORGANIZED HEALTH CARE EDUCATION/TRAINING PROGRAM

## 2022-09-19 PROCEDURE — 99214 OFFICE O/P EST MOD 30 MIN: CPT | Performed by: STUDENT IN AN ORGANIZED HEALTH CARE EDUCATION/TRAINING PROGRAM

## 2022-09-19 RX ORDER — LEVOTHYROXINE SODIUM 0.1 MG/1
100 TABLET ORAL DAILY
Qty: 30 TABLET | Refills: 1 | Status: SHIPPED | OUTPATIENT
Start: 2022-09-19 | End: 2022-10-10

## 2022-09-19 NOTE — PROGRESS NOTES
[address]: Ms Eri Farrell was seen today for fetal growth assessment ultrasound  See ultrasound report under "OB Procedures" tab  The time spent on this established patient on the encounter date included 5 minutes previsit service time reviewing records and precharting, 8 minutes face-to-face service time counseling regarding results and coordinating care, and  5 minutes charting, totalling 18 minutes  Please don't hesitate to contact our office with any concerns or questions    -Gracelyn Baumgarten, MD

## 2022-09-19 NOTE — TELEPHONE ENCOUNTER
----- Message from Roger Schofield, [address] sent at 9/19/2022  8:26 AM EDT -----  TSH/T4 still abnormal   Need to increase dose  On 88,  increase to 100mcg  Repeat in 4 wks  Please let her know

## 2022-09-19 NOTE — LETTER
September 19, 2022     Eltonenzo Elvie monahan  [address] [facility],[address] [facility]  [address],  [phone]    Patient: Uche Moreno   YOB: 1985   Date of Visit: 9/19/2022       Dear Dr Jessica Ohcoa: Thank you for referring Uche Moreno to me for evaluation  Below are my notes for this consultation  If you have questions, please do not hesitate to call me  I look forward to following your patient along with you  Sincerely,        Ta Vann MD        CC: No Recipients  Ta Vann MD  9/19/2022 11:31 AM  Sign when Signing Visit  [address]: Ms Ford Rodriguez was seen today for fetal growth assessment ultrasound  See ultrasound report under "OB Procedures" tab  The time spent on this established patient on the encounter date included 5 minutes previsit service time reviewing records and precharting, 8 minutes face-to-face service time counseling regarding results and coordinating care, and  5 minutes charting, totalling 18 minutes  Please don't hesitate to contact our office with any concerns or questions    -Ta Vann MD

## 2022-09-19 NOTE — RESULT ENCOUNTER NOTE
TSH/T4 still abnormal   Need to increase dose  On 88,  increase to 100mcg  Repeat in 4 wks  Please let her know

## 2022-09-19 NOTE — RESULT ENCOUNTER NOTE
Discussed thyroid results with patient by phone, 100mcg daily levothyroxine sent in by OB  Agree with increase in levothyroxine from 88mcg daily to 100mcg daily  Recommended close followup and recheck of labs in 2 weeks  Orders will be placed today

## 2022-09-19 NOTE — TELEPHONE ENCOUNTER
Spoke with Jose Fried to review [employer]  She was already aware by another provider  She will  her rx today and is aware to repeat her lab work in 4 weeks  Orders already placed in EMR

## 2022-09-21 PROBLEM — Z34.83 ENCOUNTER FOR SUPERVISION OF NORMAL PREGNANCY IN MULTIGRAVIDA IN THIRD TRIMESTER: Status: ACTIVE | Noted: 2022-08-16

## 2022-09-21 PROBLEM — Z3A.28 28 WEEKS GESTATION OF PREGNANCY: Status: ACTIVE | Noted: 2022-08-31

## 2022-09-21 NOTE — PROGRESS NOTES
OB/GYN  PN Visit  Judy Matos  [id number]  2022  11:22 AM  Dr Dayday Alejandre MD    S: 39 y o  [id number] 28w0d here for PN visit  Chief Complaint   Patient presents with    Routine Prenatal Visit     Patient reports no concerns         OB complaints:  Contractions: no  Leakage: no  Bleeding: no  Fetal movement: yes      O:  /70 (BP Location: Right arm, Cuff Size: Standard)   Pulse 85   Temp 98 °F (36 7 °C) (Tympanic)   Wt 59 kg (130 lb)   LMP  (LMP Unknown)   BMI 27 17 kg/m²       Review of Systems   Constitutional: Negative  HENT: Negative  Eyes: Negative  Respiratory: Negative  Cardiovascular: Negative  Gastrointestinal: Negative  Endocrine: Negative  Genitourinary:        As noted in HPI   Musculoskeletal: Negative  Skin: Negative  Allergic/Immunologic: Negative  Neurological: Negative  Hematological: Negative  Psychiatric/Behavioral: Negative  Physical Exam  Constitutional:       General: Judy Matos is not in acute distress  Appearance: Judy Matos is well-developed  Abdominal:      Palpations: Abdomen is soft  Tenderness: There is no abdominal tenderness  There is no guarding  Neurological:      Mental Status: Judy Matos is alert and oriented to person, place, and time  Skin:     General: Skin is warm and dry     Psychiatric:         Behavior: Behavior normal              Pregravid Weight/BMI: 49 9 kg (110 lb) (BMI 23 00)  Current Weight: 59 kg (130 lb)   Total Weight Gain: 9 072 kg (20 lb)   Pre-Nicky Vitals    Flowsheet Row Most Recent Value   Prenatal Assessment    Fetal Heart Rate 159   Fundal Height (cm) 29 cm   Movement Present   Prenatal Vitals    Blood Pressure 112/70   Weight - Scale 59 kg (130 lb)   Urine Albumin/Glucose    Dilation/Effacement/Station    Vaginal Drainage    Edema            Problem List        Endocrine    Acquired hypothyroidism    Hypothyroidism affecting pregnancy    Overview     - TSH 45- restarted at 88mcg   Rpt TSH 4 wks  Referred to endo            Other    Late prenatal care    Overview     1st appt at 23 wks         Pregnancy with poor obstetric history    Previous child with congenital anomaly, currently pregnant, antepartum    Encounter for supervision of normal pregnancy in multigravida in third trimester    Pregnancy complicated by fetal abdominal abnormality    Overview     Liver cystic mass - appt at [facility] on 22         28 weeks gestation of pregnancy    Overview     Declines flu vaccine and COVID vaccine  Agrees to TDAP           Other Visit Diagnoses     Third trimester pregnancy    -  Primary    Need for Tdap vaccination        Request for sterilization        Abnormal maternal glucose tolerance, antepartum             Tdap was given today  MA 31 sinus patient requesting sterilization  Advised patient to complete glucose testing for abnormal Glucola    Follow-up in 2 weeks  Discussed recommendation for flu vaccine, she will consider this      Future Appointments   Date Time Provider Damaso Rothman   10/7/2022 10:45 AM Joshua Willett MD [facility]   10/21/2022  9:00 AM   [address]   2023 11:30 AM Jarred Quezada [facility]               Joshua Willett MD  2022  11:22 AM

## 2022-09-21 NOTE — PATIENT INSTRUCTIONS
Pregnancy at 27 to 30 [address]:   You may notice new symptoms such as shortness of breath, heartburn, or swelling of your ankles and feet  You may also have trouble sleeping or contractions  DISCHARGE INSTRUCTIONS:   Return to the emergency department if:   You develop a severe headache that does not go away  You have new or increased vision changes, such as blurred or spotted vision  You have new or increased swelling in your face or hands  You have vaginal spotting or bleeding  Your water broke or you feel warm water gushing or trickling from your vagina  Call your doctor or obstetrician if:   You have more than 5 contractions in 1 hour  You notice any changes in your baby's movements  You have abdominal cramps, pressure, or tightening  You have a change in vaginal discharge  You have chills or a fever  You have vaginal itching, burning, or pain  You have yellow, green, white, or foul-smelling vaginal discharge  You have pain or burning when you urinate, less urine than usual, or pink or bloody urine  You have questions or concerns about your condition or care  How to care for yourself at this stage of your pregnancy:       Eat a variety of healthy foods  Healthy foods include fruits, vegetables, whole-grain breads, low-fat dairy foods, beans, lean meats, and fish  Drink liquids as directed  Ask how much liquid to drink each day and which liquids are best for you  Limit caffeine to less than 200 milligrams each day  Limit your intake of fish to 2 servings each week  Choose fish low in mercury such as canned light tuna, shrimp, salmon, cod, or tilapia  Do not  eat fish high in mercury such as swordfish, tilefish, tye mackerel, and shark  Manage heartburn  by eating 4 or 5 small meals each day instead of large meals  Avoid spicy food  Manage swelling  by lying down and putting your feet up  Take prenatal vitamins as directed  Your need for certain vitamins and minerals, such as folic acid, increases during pregnancy  Prenatal vitamins provide some of the extra vitamins and minerals you need  Prenatal vitamins may also help to decrease the risk of certain birth defects  Talk to your healthcare provider about exercise  Moderate exercise can help you stay fit  Your healthcare provider will help you plan an exercise program that is safe for you during pregnancy  Do not smoke  Smoking increases your risk of a miscarriage and other health problems during your pregnancy  Smoking can cause your baby to be born too early or weigh less at birth  Ask your healthcare provider for information if you need help quitting  Do not drink alcohol  Alcohol passes from your body to your baby through the placenta  It can affect your baby's brain development and cause fetal alcohol syndrome (FAS)  FAS is a group of conditions that causes mental, behavior, and growth problems  Talk to your healthcare provider before you take any medicines  Many medicines may harm your baby if you take them when you are pregnant  Do not take any medicines, vitamins, herbs, or supplements without first talking to your healthcare provider  Never use illegal or street drugs (such as marijuana or cocaine) while you are pregnant  Safety tips during pregnancy:   Avoid hot tubs and saunas  Do not use a hot tub or sauna while you are pregnant, especially during your first trimester  Hot tubs and saunas may raise your baby's temperature and increase the risk of birth defects  Avoid toxoplasmosis  This is an infection caused by eating raw meat or being around infected cat feces  It can cause birth defects, miscarriages, and other problems  Wash your hands after you touch raw meat  Make sure any meat is well-cooked before you eat it  Avoid raw eggs and unpasteurized milk   Use gloves or ask someone else to clean your cat's litter box while you are pregnant  Changes that are happening with your baby:  By 30 weeks, your baby may weigh more than 3 pounds  Your baby may be about 11 inches long from the top of the head to the rump (baby's bottom)  Your baby's eyes open and close now  Your baby's kicks and movements are more forceful at this time  What you need to know about prenatal care: Your healthcare provider will check your blood pressure and weight  You may also need the following:  Blood tests  may be done to check for anemia or blood type  A urine test  may also be done to check for sugar and protein  These can be signs of gestational diabetes or infection  Protein in your urine may also be a sign of preeclampsia  Preeclampsia is a condition that can develop during week 20 or later of your pregnancy  It causes high blood pressure, and it can cause problems with your kidneys and other organs  A Tdap vaccine and flu vaccine  may be recommended by your healthcare provider  A gestational diabetes screen  may be done  Your healthcare provider may order either a 1-step or 2-step oral glucose tolerance test (OGTT)  1-step OGTT:  Your blood sugar level will be tested after you have not eaten for 8 hours (fasting)  You will then be given a glucose drink  Your level will be tested again 1 hour and 2 hours after you finish the drink  2-step OGTT:  You do not have to fast for the first part of the test  You will have the glucose drink at any time of day  Your blood sugar level will be checked 1 hour later  If your blood sugar is higher than a certain level, another test will be ordered  You will fast and your blood sugar level will be tested  You will have the glucose drink  Your blood will be tested again 1 hour, 2 hours, and 3 hours after you finish the glucose drink  Fundal height  is a measurement of your uterus to check your baby's growth  This number is usually the same as the number of weeks that you have been pregnant   Your healthcare provider may also check your baby's position  Your baby's heart rate  will be checked  Follow up with your doctor or obstetrician as directed:  Write down your questions so you remember to ask them during your visits  © Copyright [employer] 2022 Information is for End User's use only and may not be sold, redistributed or otherwise used for commercial purposes  All illustrations and images included in CareNotes® are the copyrighted property of A D A M , Inc  or [facility] Domo Lamas   The above information is an  only  It is not intended as medical advice for individual conditions or treatments  Talk to your doctor, nurse or pharmacist before following any medical regimen to see if it is safe and effective for you

## 2022-09-23 ENCOUNTER — ROUTINE PRENATAL (OUTPATIENT)
Dept: OBGYN CLINIC | Facility: CLINIC | Age: 37
End: 2022-09-23
Payer: COMMERCIAL

## 2022-09-23 VITALS
DIASTOLIC BLOOD PRESSURE: 70 MMHG | BODY MASS INDEX: 27.17 KG/M2 | HEART RATE: 85 BPM | WEIGHT: 130 LBS | SYSTOLIC BLOOD PRESSURE: 112 MMHG | TEMPERATURE: 98 F

## 2022-09-23 DIAGNOSIS — O09.299 PREGNANCY WITH POOR OBSTETRIC HISTORY: ICD-10-CM

## 2022-09-23 DIAGNOSIS — O35.8XX0 PREGNANCY COMPLICATED BY FETAL ABDOMINAL ABNORMALITY, SINGLE OR UNSPECIFIED FETUS: ICD-10-CM

## 2022-09-23 DIAGNOSIS — O99.810 ABNORMAL MATERNAL GLUCOSE TOLERANCE, ANTEPARTUM: ICD-10-CM

## 2022-09-23 DIAGNOSIS — Z34.83 ENCOUNTER FOR SUPERVISION OF NORMAL PREGNANCY IN MULTIGRAVIDA IN THIRD TRIMESTER: ICD-10-CM

## 2022-09-23 DIAGNOSIS — Z3A.28 28 WEEKS GESTATION OF PREGNANCY: ICD-10-CM

## 2022-09-23 DIAGNOSIS — Z30.2 REQUEST FOR STERILIZATION: ICD-10-CM

## 2022-09-23 DIAGNOSIS — O35.2XX0 PREVIOUS CHILD WITH CONGENITAL ANOMALY, CURRENTLY PREGNANT, ANTEPARTUM, SINGLE OR UNSPECIFIED FETUS: ICD-10-CM

## 2022-09-23 DIAGNOSIS — O09.30 LATE PRENATAL CARE: ICD-10-CM

## 2022-09-23 DIAGNOSIS — Z34.93 THIRD TRIMESTER PREGNANCY: Primary | ICD-10-CM

## 2022-09-23 DIAGNOSIS — E03.9 HYPOTHYROIDISM AFFECTING PREGNANCY IN THIRD TRIMESTER: ICD-10-CM

## 2022-09-23 DIAGNOSIS — O99.283 HYPOTHYROIDISM AFFECTING PREGNANCY IN THIRD TRIMESTER: ICD-10-CM

## 2022-09-23 DIAGNOSIS — Z23 NEED FOR TDAP VACCINATION: ICD-10-CM

## 2022-09-23 LAB
2ND TRIMESTER 4 SCREEN SERPL-IMP: NORMAL
AFP ADJ MOM SERPL: NORMAL
AFP INTERP AMN-IMP: NORMAL
AFP INTERP SERPL-IMP: NORMAL
AFP INTERP SERPL-IMP: NORMAL
AFP SERPL-MCNC: 126.5 NG/ML
AGE AT DELIVERY: 37.1 YR
GA METHOD: NORMAL
GA: 27.3 WEEKS
IDDM PATIENT QL: NO
MULTIPLE PREGNANCY: NO
NEURAL TUBE DEFECT RISK FETUS: NORMAL %
SL AMB  POCT GLUCOSE, UA: NEGATIVE
SL AMB POCT UROBILINOGEN: ABNORMAL

## 2022-09-23 PROCEDURE — 99213 OFFICE O/P EST LOW 20 MIN: CPT | Performed by: OBSTETRICS & GYNECOLOGY

## 2022-09-23 PROCEDURE — 81002 URINALYSIS NONAUTO W/O SCOPE: CPT | Performed by: OBSTETRICS & GYNECOLOGY

## 2022-09-23 PROCEDURE — 90471 IMMUNIZATION ADMIN: CPT

## 2022-09-23 PROCEDURE — 90715 TDAP VACCINE 7 YRS/> IM: CPT

## 2022-10-03 PROBLEM — Z3A.30 30 WEEKS GESTATION OF PREGNANCY: Status: ACTIVE | Noted: 2022-08-31

## 2022-10-03 NOTE — PATIENT INSTRUCTIONS
Pregnancy at 27 to 30 [address]:   You may notice new symptoms such as shortness of breath, heartburn, or swelling of your ankles and feet  You may also have trouble sleeping or contractions  DISCHARGE INSTRUCTIONS:   Return to the emergency department if:   You develop a severe headache that does not go away  You have new or increased vision changes, such as blurred or spotted vision  You have new or increased swelling in your face or hands  You have vaginal spotting or bleeding  Your water broke or you feel warm water gushing or trickling from your vagina  Call your doctor or obstetrician if:   You have more than 5 contractions in 1 hour  You notice any changes in your baby's movements  You have abdominal cramps, pressure, or tightening  You have a change in vaginal discharge  You have chills or a fever  You have vaginal itching, burning, or pain  You have yellow, green, white, or foul-smelling vaginal discharge  You have pain or burning when you urinate, less urine than usual, or pink or bloody urine  You have questions or concerns about your condition or care  How to care for yourself at this stage of your pregnancy:       Eat a variety of healthy foods  Healthy foods include fruits, vegetables, whole-grain breads, low-fat dairy foods, beans, lean meats, and fish  Drink liquids as directed  Ask how much liquid to drink each day and which liquids are best for you  Limit caffeine to less than 200 milligrams each day  Limit your intake of fish to 2 servings each week  Choose fish low in mercury such as canned light tuna, shrimp, salmon, cod, or tilapia  Do not  eat fish high in mercury such as swordfish, tilefish, tye mackerel, and shark  Manage heartburn  by eating 4 or 5 small meals each day instead of large meals  Avoid spicy food  Manage swelling  by lying down and putting your feet up  Take prenatal vitamins as directed  Your need for certain vitamins and minerals, such as folic acid, increases during pregnancy  Prenatal vitamins provide some of the extra vitamins and minerals you need  Prenatal vitamins may also help to decrease the risk of certain birth defects  Talk to your healthcare provider about exercise  Moderate exercise can help you stay fit  Your healthcare provider will help you plan an exercise program that is safe for you during pregnancy  Do not smoke  Smoking increases your risk of a miscarriage and other health problems during your pregnancy  Smoking can cause your baby to be born too early or weigh less at birth  Ask your healthcare provider for information if you need help quitting  Do not drink alcohol  Alcohol passes from your body to your baby through the placenta  It can affect your baby's brain development and cause fetal alcohol syndrome (FAS)  FAS is a group of conditions that causes mental, behavior, and growth problems  Talk to your healthcare provider before you take any medicines  Many medicines may harm your baby if you take them when you are pregnant  Do not take any medicines, vitamins, herbs, or supplements without first talking to your healthcare provider  Never use illegal or street drugs (such as marijuana or cocaine) while you are pregnant  Safety tips during pregnancy:   Avoid hot tubs and saunas  Do not use a hot tub or sauna while you are pregnant, especially during your first trimester  Hot tubs and saunas may raise your baby's temperature and increase the risk of birth defects  Avoid toxoplasmosis  This is an infection caused by eating raw meat or being around infected cat feces  It can cause birth defects, miscarriages, and other problems  Wash your hands after you touch raw meat  Make sure any meat is well-cooked before you eat it  Avoid raw eggs and unpasteurized milk   Use gloves or ask someone else to clean your cat's litter box while you are pregnant  Changes that are happening with your baby:  By 30 weeks, your baby may weigh more than 3 pounds  Your baby may be about 11 inches long from the top of the head to the rump (baby's bottom)  Your baby's eyes open and close now  Your baby's kicks and movements are more forceful at this time  What you need to know about prenatal care: Your healthcare provider will check your blood pressure and weight  You may also need the following:  Blood tests  may be done to check for anemia or blood type  A urine test  may also be done to check for sugar and protein  These can be signs of gestational diabetes or infection  Protein in your urine may also be a sign of preeclampsia  Preeclampsia is a condition that can develop during week 20 or later of your pregnancy  It causes high blood pressure, and it can cause problems with your kidneys and other organs  A Tdap vaccine and flu vaccine  may be recommended by your healthcare provider  A gestational diabetes screen  may be done  Your healthcare provider may order either a 1-step or 2-step oral glucose tolerance test (OGTT)  1-step OGTT:  Your blood sugar level will be tested after you have not eaten for 8 hours (fasting)  You will then be given a glucose drink  Your level will be tested again 1 hour and 2 hours after you finish the drink  2-step OGTT:  You do not have to fast for the first part of the test  You will have the glucose drink at any time of day  Your blood sugar level will be checked 1 hour later  If your blood sugar is higher than a certain level, another test will be ordered  You will fast and your blood sugar level will be tested  You will have the glucose drink  Your blood will be tested again 1 hour, 2 hours, and 3 hours after you finish the glucose drink  Fundal height  is a measurement of your uterus to check your baby's growth  This number is usually the same as the number of weeks that you have been pregnant   Your healthcare provider may also check your baby's position  Your baby's heart rate  will be checked  Follow up with your doctor or obstetrician as directed:  Write down your questions so you remember to ask them during your visits  © Copyright [employer] 2022 Information is for End User's use only and may not be sold, redistributed or otherwise used for commercial purposes  All illustrations and images included in CareNotes® are the copyrighted property of A D A M , Inc  or [facility] Domo Lamas   The above information is an  only  It is not intended as medical advice for individual conditions or treatments  Talk to your doctor, nurse or pharmacist before following any medical regimen to see if it is safe and effective for you

## 2022-10-03 NOTE — PROGRESS NOTES
OB/GYN  PN Visit  Tayo Salguero  [id number]  10/7/2022  10:56 AM  Dr Genet Jackson    S: 39 y o  [id number] 30  here for PN visit  Chief Complaint   Patient presents with    Routine Prenatal Visit     Belly band use to help with back pain  OB complaints:  Contractions: no  Leakage: no  Bleeding: no  Fetal movement: yes      O:  /58 (BP Location: Right arm, Patient Position: Sitting, Cuff Size: Adult)   Pulse 87   Ht 4' 10" (1 473 m)   Wt 59 1 kg (130 lb 3 2 oz)   LMP  (LMP Unknown)   BMI 27 21 kg/m²       Review of Systems   Constitutional: Negative  HENT: Negative  Eyes: Negative  Respiratory: Negative  Cardiovascular: Negative  Gastrointestinal: Negative  Endocrine: Negative  Genitourinary:        As noted in HPI   Musculoskeletal: Negative  Skin: Negative  Allergic/Immunologic: Negative  Neurological: Negative  Hematological: Negative  Psychiatric/Behavioral: Negative  Physical Exam  Constitutional:       General: Tayo Salguero is not in acute distress  Appearance: Tayo Salguero is well-developed  Abdominal:      Palpations: Abdomen is soft  Tenderness: There is no abdominal tenderness  There is no guarding  Neurological:      Mental Status: Tayo Salguero is alert and oriented to person, place, and time  Skin:     General: Skin is warm and dry     Psychiatric:         Behavior: Behavior normal              Pregravid Weight/BMI: 49 9 kg (110 lb) (BMI 23 00)  Current Weight: 59 1 kg (130 lb 3 2 oz)   Total Weight Gain: 9 163 kg (20 lb 3 2 oz)   Pre-Nicky Vitals    Flowsheet Row Most Recent Value   Prenatal Assessment    Fetal Heart Rate 141   Fundal Height (cm) 30 cm   Movement Present   Presentation Vertex   Prenatal Vitals    Blood Pressure 102/58   Weight - Scale 59 1 kg (130 lb 3 2 oz)   Urine Albumin/Glucose    Dilation/Effacement/Station    Vaginal Drainage    Edema            Problem List        Endocrine Acquired hypothyroidism    Hypothyroidism affecting pregnancy    Overview     - TSH 45- restarted at 88mcg   Rpt TSH 4 wks  Referred to endo         Abnormal maternal glucose tolerance, antepartum    Overview     Abnormal glucola  3 hour GTT             Other    Late prenatal care    Overview     1st appt at 23 wks         Pregnancy with poor obstetric history    Previous child with congenital anomaly, currently pregnant, antepartum    Encounter for supervision of normal pregnancy in multigravida in third trimester    Pregnancy complicated by fetal abdominal abnormality    Overview     Liver cystic mass - appt at [facility] on 22         30 weeks gestation of pregnancy    Overview     Declines COVID vaccine  Flu vaccine and TDAP received           Request for sterilization    Overview     MA 31 signed           Other Visit Diagnoses     Third trimester pregnancy    -  Primary    Flu vaccine need             Still waiting on 3 hour GTT  Flu vaccine given today  Follow-up in 2 weeks  Growth scan in 2 weeks        Future Appointments   Date Time Provider Damaso Rothman   10/21/2022  9:00 AM   [address]   2023 11:30 AM [address] [facility]               Garland Solano  10/7/2022  10:56 AM

## 2022-10-07 ENCOUNTER — LAB (OUTPATIENT)
Dept: LAB | Facility: CLINIC | Age: 37
End: 2022-10-07
Payer: COMMERCIAL

## 2022-10-07 ENCOUNTER — ROUTINE PRENATAL (OUTPATIENT)
Dept: OBGYN CLINIC | Facility: CLINIC | Age: 37
End: 2022-10-07
Payer: COMMERCIAL

## 2022-10-07 VITALS
WEIGHT: 130.2 LBS | HEIGHT: 58 IN | BODY MASS INDEX: 27.33 KG/M2 | SYSTOLIC BLOOD PRESSURE: 102 MMHG | HEART RATE: 87 BPM | DIASTOLIC BLOOD PRESSURE: 58 MMHG

## 2022-10-07 DIAGNOSIS — O09.30 LATE PRENATAL CARE: ICD-10-CM

## 2022-10-07 DIAGNOSIS — Z30.2 REQUEST FOR STERILIZATION: ICD-10-CM

## 2022-10-07 DIAGNOSIS — E03.9 HYPOTHYROIDISM AFFECTING PREGNANCY IN SECOND TRIMESTER: ICD-10-CM

## 2022-10-07 DIAGNOSIS — Z34.93 THIRD TRIMESTER PREGNANCY: Primary | ICD-10-CM

## 2022-10-07 DIAGNOSIS — O35.8XX0 PREGNANCY COMPLICATED BY FETAL ABDOMINAL ABNORMALITY, SINGLE OR UNSPECIFIED FETUS: ICD-10-CM

## 2022-10-07 DIAGNOSIS — O99.810 ABNORMAL MATERNAL GLUCOSE TOLERANCE, ANTEPARTUM: ICD-10-CM

## 2022-10-07 DIAGNOSIS — O99.283 HYPOTHYROIDISM AFFECTING PREGNANCY IN THIRD TRIMESTER: ICD-10-CM

## 2022-10-07 DIAGNOSIS — Z3A.30 30 WEEKS GESTATION OF PREGNANCY: ICD-10-CM

## 2022-10-07 DIAGNOSIS — O99.282 HYPOTHYROIDISM AFFECTING PREGNANCY IN SECOND TRIMESTER: ICD-10-CM

## 2022-10-07 DIAGNOSIS — Z23 FLU VACCINE NEED: ICD-10-CM

## 2022-10-07 DIAGNOSIS — E03.9 HYPOTHYROIDISM AFFECTING PREGNANCY IN THIRD TRIMESTER: ICD-10-CM

## 2022-10-07 LAB
SL AMB  POCT GLUCOSE, UA: NEGATIVE
SL AMB POCT URINE PROTEIN: NEGATIVE
T4 FREE SERPL-MCNC: 0.88 NG/DL (ref 0.76–1.46)
TSH SERPL DL<=0.05 MIU/L-ACNC: 3.83 UIU/ML (ref 0.45–4.5)

## 2022-10-07 PROCEDURE — 81002 URINALYSIS NONAUTO W/O SCOPE: CPT | Performed by: OBSTETRICS & GYNECOLOGY

## 2022-10-07 PROCEDURE — 90686 IIV4 VACC NO PRSV 0.5 ML IM: CPT

## 2022-10-07 PROCEDURE — 99213 OFFICE O/P EST LOW 20 MIN: CPT | Performed by: OBSTETRICS & GYNECOLOGY

## 2022-10-07 PROCEDURE — 84443 ASSAY THYROID STIM HORMONE: CPT

## 2022-10-07 PROCEDURE — 36415 COLL VENOUS BLD VENIPUNCTURE: CPT

## 2022-10-07 PROCEDURE — 84439 ASSAY OF FREE THYROXINE: CPT

## 2022-10-07 PROCEDURE — 90471 IMMUNIZATION ADMIN: CPT

## 2022-10-10 ENCOUNTER — TELEPHONE (OUTPATIENT)
Dept: OBGYN CLINIC | Facility: CLINIC | Age: 37
End: 2022-10-10

## 2022-10-10 RX ORDER — LEVOTHYROXINE SODIUM 112 UG/1
112 TABLET ORAL DAILY
Qty: 30 TABLET | Refills: 1 | Status: SHIPPED | OUTPATIENT
Start: 2022-10-10 | End: 2022-10-11

## 2022-10-10 NOTE — TELEPHONE ENCOUNTER
----- Message from [facility] [facility], [employer] Tomer Wood sent at 10/10/2022 10:20 AM EDT -----  TSH improving but not optimal for pregnancy  Current dose is 100 mcg  Have pt increase to 112mcg  rpt TSH 4 wks  Orders placed

## 2022-10-10 NOTE — RESULT ENCOUNTER NOTE
TSH improving but not optimal for pregnancy  Current dose is 100 mcg  Have pt increase to 112mcg  rpt TSH 4 wks  Orders placed

## 2022-10-11 DIAGNOSIS — E03.9 HYPOTHYROIDISM AFFECTING PREGNANCY IN SECOND TRIMESTER: ICD-10-CM

## 2022-10-11 DIAGNOSIS — O99.282 HYPOTHYROIDISM AFFECTING PREGNANCY IN SECOND TRIMESTER: ICD-10-CM

## 2022-10-11 RX ORDER — LEVOTHYROXINE SODIUM 0.1 MG/1
100 TABLET ORAL DAILY
Qty: 30 TABLET | Refills: 3 | Status: SHIPPED | OUTPATIENT
Start: 2022-10-11 | End: 2023-01-23

## 2022-10-20 NOTE — PROGRESS NOTES
[address]: Ms Joleen Santamaria was seen today for fetal growth assessment ultrasound  See ultrasound report under "OB Procedures" tab  MDM:   I  Diagnoses/Problems addressed:  hepatic cyst  II  Data: I reviewed 2 lab tests ordered by another provider  III  Risk of morbidity: minimal    Please don't hesitate to contact our office with any concerns or questions    -[place]

## 2022-10-21 ENCOUNTER — ROUTINE PRENATAL (OUTPATIENT)
Dept: OBGYN CLINIC | Facility: CLINIC | Age: 37
End: 2022-10-21
Payer: COMMERCIAL

## 2022-10-21 ENCOUNTER — ULTRASOUND (OUTPATIENT)
Dept: PERINATAL CARE | Facility: CLINIC | Age: 37
End: 2022-10-21
Payer: COMMERCIAL

## 2022-10-21 VITALS
DIASTOLIC BLOOD PRESSURE: 64 MMHG | BODY MASS INDEX: 27.37 KG/M2 | SYSTOLIC BLOOD PRESSURE: 102 MMHG | HEART RATE: 88 BPM | WEIGHT: 130.4 LBS | HEIGHT: 58 IN

## 2022-10-21 VITALS
DIASTOLIC BLOOD PRESSURE: 62 MMHG | HEIGHT: 58 IN | WEIGHT: 129.4 LBS | SYSTOLIC BLOOD PRESSURE: 100 MMHG | BODY MASS INDEX: 27.16 KG/M2 | HEART RATE: 108 BPM

## 2022-10-21 DIAGNOSIS — E03.9 HYPOTHYROIDISM AFFECTING PREGNANCY IN THIRD TRIMESTER: ICD-10-CM

## 2022-10-21 DIAGNOSIS — Z3A.32 32 WEEKS GESTATION OF PREGNANCY: Primary | ICD-10-CM

## 2022-10-21 DIAGNOSIS — O09.523 MULTIGRAVIDA OF ADVANCED MATERNAL AGE IN THIRD TRIMESTER: ICD-10-CM

## 2022-10-21 DIAGNOSIS — O99.283 HYPOTHYROIDISM AFFECTING PREGNANCY IN THIRD TRIMESTER: ICD-10-CM

## 2022-10-21 DIAGNOSIS — Z34.83 ENCOUNTER FOR SUPERVISION OF NORMAL PREGNANCY IN MULTIGRAVIDA IN THIRD TRIMESTER: ICD-10-CM

## 2022-10-21 DIAGNOSIS — O09.30 LATE PRENATAL CARE: Primary | ICD-10-CM

## 2022-10-21 DIAGNOSIS — O26.843 UTERINE SIZE-DATE DISCREPANCY IN THIRD TRIMESTER: ICD-10-CM

## 2022-10-21 DIAGNOSIS — O35.8XX0 PREGNANCY COMPLICATED BY FETAL ABDOMINAL ABNORMALITY, SINGLE OR UNSPECIFIED FETUS: ICD-10-CM

## 2022-10-21 DIAGNOSIS — Z36.89 ENCOUNTER FOR ULTRASOUND TO CHECK FETAL GROWTH: ICD-10-CM

## 2022-10-21 DIAGNOSIS — Z3A.32 32 WEEKS GESTATION OF PREGNANCY: ICD-10-CM

## 2022-10-21 DIAGNOSIS — O99.810 ABNORMAL MATERNAL GLUCOSE TOLERANCE, ANTEPARTUM: ICD-10-CM

## 2022-10-21 DIAGNOSIS — O09.299 PREGNANCY WITH POOR OBSTETRIC HISTORY: ICD-10-CM

## 2022-10-21 PROCEDURE — 99213 OFFICE O/P EST LOW 20 MIN: CPT | Performed by: OBSTETRICS & GYNECOLOGY

## 2022-10-21 PROCEDURE — 76816 OB US FOLLOW-UP PER FETUS: CPT | Performed by: STUDENT IN AN ORGANIZED HEALTH CARE EDUCATION/TRAINING PROGRAM

## 2022-10-21 PROCEDURE — 99214 OFFICE O/P EST MOD 30 MIN: CPT | Performed by: STUDENT IN AN ORGANIZED HEALTH CARE EDUCATION/TRAINING PROGRAM

## 2022-10-21 NOTE — LETTER
October 21, 2022     Elvie Benitez  [address] [facility],[address] [facility]  [address],  [phone] [id number]    Patient: Bethanie Winters   YOB: 1985   Date of Visit: 10/21/2022       Dear Dr Dexter Guthrie: Thank you for referring Bethanie Winters to me for evaluation  Below are my notes for this consultation  If you have questions, please do not hesitate to call me  I look forward to following your patient along with you  Sincerely,        Deshawn Varma MD        CC: No Recipients  Deshawn Varma MD  10/21/2022  9:58 AM  Sign when Signing Visit  [address]: Ms Clay Penaloza was seen today for fetal growth assessment ultrasound  See ultrasound report under "OB Procedures" tab  MDM:   I  Diagnoses/Problems addressed:  hepatic cyst  II  Data: I reviewed 2 lab tests ordered by another provider  III  Risk of morbidity: minimal    Please don't hesitate to contact our office with any concerns or questions    -Deshawn Varma

## 2022-10-21 NOTE — PROGRESS NOTES
S: 39 y o  [id number] 32w2d here for routine prenatal visit  Chief Complaint   Patient presents with   • Routine Prenatal Visit     No problems or concerns          OB complaints:  Contractions: few irregular   Leakage: no  Bleeding: no  Fetal movement: yes      O:  /64   Pulse 88   Ht 4' 10" (1 473 m)   Wt 59 1 kg (130 lb 6 4 oz)   LMP  (LMP Unknown)   BMI 27 25 kg/m²       Review of Systems   Constitutional: Negative for chills and fever  Eyes: Negative for visual disturbance  Respiratory: Negative for chest tightness and shortness of breath  Cardiovascular: Negative for chest pain  Gastrointestinal: Negative for abdominal pain, diarrhea, nausea and vomiting  Genitourinary: Negative for pelvic pain and vaginal bleeding  As noted in HPI   Skin: Negative for rash  Neurological: Negative for headaches  All other systems reviewed and are negative  Physical Exam  Constitutional:       General: Bethanie Winters is not in acute distress  Appearance: Normal appearance  HENT:      Head: Normocephalic and atraumatic  Cardiovascular:      Rate and Rhythm: Normal rate  Pulmonary:      Effort: Pulmonary effort is normal  No respiratory distress  Abdominal:      General: There is no distension  Palpations: Abdomen is soft  Tenderness: There is no abdominal tenderness  There is no guarding or rebound  Comments: gravid   Neurological:      General: No focal deficit present  Mental Status: Bethanie Winters is alert  Psychiatric:         Mood and Affect: Mood normal          Behavior: Behavior normal    Vitals and nursing note reviewed                Fetal Heart Rate: 140    Pregravid Weight/BMI: 49 9 kg (110 lb) (BMI 23 00)  Current Weight: 59 1 kg (130 lb 6 4 oz)   Total Weight Gain: 9 253 kg (20 lb 6 4 oz)     Pre-Nicky Vitals    Flowsheet Row Most Recent Value   Prenatal Assessment    Fetal Heart Rate 140   Movement Present   Prenatal Vitals    Blood Pressure 102/64   Weight - Scale 59 1 kg (130 lb 6 4 oz)   Urine Albumin/Glucose    Dilation/Effacement/Station    Vaginal Drainage    Edema             Results for orders placed or performed in visit on 10/07/22   POCT urine dip   Result Value Ref Range    POCT URINE PROTEIN negative     GLUCOSE, UA negative          Problem List        Endocrine    Acquired hypothyroidism    Hypothyroidism affecting pregnancy    Overview     - TSH 45- restarted at 88mcg   Rpt TSH 4 wks, increased to 100 mcg   Referred to endo         Current Assessment & Plan     Plans repeat TSH early next month per Endocrine  Taking synthroid as prescribed         Abnormal maternal glucose tolerance, antepartum    Overview     Abnormal glucola  3 hour GTT          Current Assessment & Plan     Pt aware of need to do 3 hr GTT            Other    Late prenatal care    Overview     1st appt at 23 wks         Pregnancy with poor obstetric history    Previous child with congenital anomaly, currently pregnant, antepartum    Encounter for supervision of normal pregnancy in multigravida in third trimester    Overview     Declines COVID vaccine  Flu vaccine and TDAP received  NIPT, ms AFP low risk          Current Assessment & Plan     Given consents to review, sign next visit         Pregnancy complicated by fetal abdominal abnormality    Overview     Liver cystic mass - appt at [facility] on 22  Unlikely to require urgent pediatric surgical evaluation, no current contraindications to delivery at [facility] or           Current Assessment & Plan     Has f/u appt with [facility]            32 weeks gestation of pregnancy    Request for sterilization    Overview     MA 31 signed         Uterine size-date discrepancy in third trimester    Overview     EFW 12th%, f/u growth US          Current Assessment & Plan     Had growth US today, has f/u scheduled                                  [facility] Setting  10/21/2022  12:16 PM

## 2022-11-04 ENCOUNTER — ROUTINE PRENATAL (OUTPATIENT)
Dept: OBGYN CLINIC | Facility: CLINIC | Age: 37
End: 2022-11-04

## 2022-11-04 VITALS
WEIGHT: 132 LBS | DIASTOLIC BLOOD PRESSURE: 84 MMHG | BODY MASS INDEX: 27.71 KG/M2 | HEIGHT: 58 IN | SYSTOLIC BLOOD PRESSURE: 122 MMHG | OXYGEN SATURATION: 97 % | TEMPERATURE: 98.5 F | HEART RATE: 101 BPM

## 2022-11-04 DIAGNOSIS — O09.299 PREGNANCY WITH POOR OBSTETRIC HISTORY: ICD-10-CM

## 2022-11-04 DIAGNOSIS — Z3A.34 34 WEEKS GESTATION OF PREGNANCY: ICD-10-CM

## 2022-11-04 DIAGNOSIS — O99.283 HYPOTHYROIDISM AFFECTING PREGNANCY IN THIRD TRIMESTER: ICD-10-CM

## 2022-11-04 DIAGNOSIS — E03.9 HYPOTHYROIDISM AFFECTING PREGNANCY IN THIRD TRIMESTER: ICD-10-CM

## 2022-11-04 DIAGNOSIS — O99.810 ABNORMAL MATERNAL GLUCOSE TOLERANCE, ANTEPARTUM: ICD-10-CM

## 2022-11-04 DIAGNOSIS — O26.843 UTERINE SIZE-DATE DISCREPANCY IN THIRD TRIMESTER: Primary | ICD-10-CM

## 2022-11-04 DIAGNOSIS — Z3A.32 32 WEEKS GESTATION OF PREGNANCY: ICD-10-CM

## 2022-11-04 DIAGNOSIS — O09.30 LATE PRENATAL CARE: ICD-10-CM

## 2022-11-04 LAB
DME PARACHUTE DELIVERY DATE REQUESTED: NORMAL
DME PARACHUTE ITEM DESCRIPTION: NORMAL
DME PARACHUTE ORDER STATUS: NORMAL
DME PARACHUTE SUPPLIER NAME: NORMAL
DME PARACHUTE SUPPLIER PHONE: NORMAL
SL AMB  POCT GLUCOSE, UA: NEGATIVE
SL AMB POCT URINE PROTEIN: 30

## 2022-11-04 NOTE — ASSESSMENT & PLAN NOTE
Consents reviewed and signed  Breast pump sent through [facility]   OB precautions reviewed

## 2022-11-04 NOTE — PATIENT INSTRUCTIONS
[email or web address]    [address]Hien Romero [address]   [facility] [facility] [place], [phone] ([phone])    Breastfeeding resources/websites:   [facility]

## 2022-11-04 NOTE — PROGRESS NOTES
S: 40 y o  [id number] 34w2d here for routine prenatal visit  Chief Complaint   Patient presents with   • Routine Prenatal Visit     No concerns         OB complaints:  Contractions: few, irregular, not painful   Leakage: no  Bleeding: no  Fetal movement: yes        O:  /84 (BP Location: Right arm, Patient Position: Sitting, Cuff Size: Adult)   Pulse 101   Temp 98 5 °F (36 9 °C) (Tympanic)   Ht 4' 10" (1 473 m)   Wt 59 9 kg (132 lb)   LMP  (LMP Unknown)   SpO2 97%   BMI 27 59 kg/m²       Review of Systems   Constitutional: Negative for appetite change, chills and fever  Eyes: Negative for visual disturbance  Respiratory: Negative for cough, chest tightness and shortness of breath  Cardiovascular: Negative for chest pain  Gastrointestinal: Negative for abdominal distention, abdominal pain, constipation, diarrhea, nausea and vomiting  Endocrine: Negative for cold intolerance and heat intolerance  Genitourinary: Negative for difficulty urinating, dyspareunia, dysuria, frequency, genital sores, pelvic pain, urgency, vaginal bleeding, vaginal discharge and vaginal pain  Musculoskeletal: Negative for arthralgias  Neurological: Negative for light-headedness and headaches  Hematological: Does not bruise/bleed easily  Psychiatric/Behavioral: Negative for behavioral problems  All other systems reviewed and are negative  Physical Exam  Constitutional:       General: Danny Reddy is not in acute distress  Appearance: Normal appearance  HENT:      Head: Normocephalic and atraumatic  Cardiovascular:      Rate and Rhythm: Normal rate  Pulmonary:      Effort: Pulmonary effort is normal  No respiratory distress  Abdominal:      General: There is no distension  Palpations: Abdomen is soft  Tenderness: There is no abdominal tenderness  There is no guarding or rebound  Comments: gravid   Neurological:      General: No focal deficit present        Mental Status: Topher Ramos is alert  Psychiatric:         Mood and Affect: Mood normal          Behavior: Behavior normal    Vitals and nursing note reviewed                Fetal Heart Rate: 130    Pregravid Weight/BMI: 49 9 kg (110 lb) (BMI 23 00)  Current Weight: 59 9 kg (132 lb)   Total Weight Gain: 9 979 kg (22 lb)     Pre- Vitals    Flowsheet Row Most Recent Value   Prenatal Assessment    Fetal Heart Rate 130   Movement Present   Prenatal Vitals    Blood Pressure 122/84   Weight - Scale 59 9 kg (132 lb)   Urine Albumin/Glucose    Dilation/Effacement/Station    Vaginal Drainage    Edema             Results for orders placed or performed in visit on 22   Home Grade Breast Pump   Result Value Ref Range    Supplier Name [facility]/[address]     Supplier Phone Number [phone]     Order Status Supplier Submitted     Delivery Note      Delivery Request Date 2022     Item Description Home grade breast pump, Spectra S1    POCT urine dip   Result Value Ref Range    POCT URINE PROTEIN 30     GLUCOSE, UA negative          Problem List        Endocrine    Acquired hypothyroidism    Hypothyroidism affecting pregnancy    Overview     - TSH 45- restarted at 88mcg   Rpt TSH 4 wks, increased to 100 mcg   Referred to endo         Current Assessment & Plan     Due for repeat thyroid testing per Endocrinologist, reminded pt          Abnormal maternal glucose tolerance, antepartum    Overview     Abnormal glucola  3 hour GTT          Current Assessment & Plan     Pt has had difficulty with scheduling 3 hr GTT  Discussed option of fingerstick testing and diabetic education consult - pt amenable, she knows how to check sugars due to her mother having DM2 - referral placed             Other    Late prenatal care    Overview     1st appt at 23 wks         Pregnancy with poor obstetric history    Previous child with congenital anomaly, currently pregnant, antepartum    Encounter for supervision of normal pregnancy in multigravida in third trimester    Overview     Declines COVID vaccine  Flu vaccine and TDAP received  NIPT, ms AFP low risk   Consents signed         Current Assessment & Plan     Consents reviewed and signed  Breast pump sent through [facility]   OB precautions reviewed         Pregnancy complicated by fetal abdominal abnormality    Overview     Liver cystic mass - appt at [facility] on 22  Unlikely to require urgent pediatric surgical evaluation, no current contraindications to delivery at [facility] or [facility]          Current Assessment & Plan     Had repeat US at [address] today, report not yet available, per pt growth was normal and liver mass did not require urgent f/u, they were recommended to f/u postpartum          34 weeks gestation of pregnancy    Request for sterilization    Overview     MA 31 signed         Uterine size-date discrepancy in third trimester    Overview     EFW 12th%, f/u growth US                                  Chel Bronson  2022  3:45 PM

## 2022-11-04 NOTE — ASSESSMENT & PLAN NOTE
Had repeat US at [address] today, report not yet available, per pt growth was normal and liver mass did not require urgent f/u, they were recommended to f/u postpartum

## 2022-11-04 NOTE — ASSESSMENT & PLAN NOTE
Pt has had difficulty with scheduling 3 hr GTT  Discussed option of fingerstick testing and diabetic education consult - pt amenable, she knows how to check sugars due to her mother having DM2 - referral placed

## 2022-11-06 ENCOUNTER — ANESTHESIA (OUTPATIENT)
Dept: LABOR AND DELIVERY | Facility: HOSPITAL | Age: 37
End: 2022-11-06

## 2022-11-06 ENCOUNTER — HOSPITAL ENCOUNTER (INPATIENT)
Facility: HOSPITAL | Age: 37
LOS: 3 days | Discharge: HOME | End: 2022-11-09
Attending: STUDENT IN AN ORGANIZED HEALTH CARE EDUCATION/TRAINING PROGRAM | Admitting: STUDENT IN AN ORGANIZED HEALTH CARE EDUCATION/TRAINING PROGRAM

## 2022-11-06 ENCOUNTER — ANESTHESIA EVENT (OUTPATIENT)
Dept: LABOR AND DELIVERY | Facility: HOSPITAL | Age: 37
End: 2022-11-06

## 2022-11-06 DIAGNOSIS — O46.90 VAGINAL BLEEDING DURING PREGNANCY: Primary | ICD-10-CM

## 2022-11-06 DIAGNOSIS — Z3A.34 34 WEEKS GESTATION OF PREGNANCY: ICD-10-CM

## 2022-11-06 DIAGNOSIS — Z30.2 REQUEST FOR STERILIZATION: ICD-10-CM

## 2022-11-06 DIAGNOSIS — Z98.891 STATUS POST PRIMARY LOW TRANSVERSE CESAREAN SECTION: ICD-10-CM

## 2022-11-06 DIAGNOSIS — O60.00 PRETERM LABOR: ICD-10-CM

## 2022-11-06 PROBLEM — O60.03 PRETERM LABOR IN THIRD TRIMESTER: Status: ACTIVE | Noted: 2022-11-06

## 2022-11-06 LAB
ABO GROUP BLD: NORMAL
ALBUMIN SERPL BCP-MCNC: 2.5 G/DL (ref 3.5–5)
ALP SERPL-CCNC: 161 U/L (ref 46–116)
ALT SERPL W P-5'-P-CCNC: 14 U/L (ref 12–78)
AMPHETAMINES SERPL QL SCN: NEGATIVE
ANION GAP SERPL CALCULATED.3IONS-SCNC: 12 MMOL/L (ref 4–13)
APTT PPP: 25 SECONDS (ref 23–37)
AST SERPL W P-5'-P-CCNC: 11 U/L (ref 5–45)
BARBITURATES UR QL: NEGATIVE
BASE EXCESS BLDCOA CALC-SCNC: -13.6 MMOL/L (ref 3–11)
BASE EXCESS BLDCOV CALC-SCNC: -13.8 MMOL/L (ref 1–9)
BENZODIAZ UR QL: NEGATIVE
BILIRUB SERPL-MCNC: 0.34 MG/DL (ref 0.2–1)
BLD GP AB SCN SERPL QL: NEGATIVE
BUN SERPL-MCNC: 11 MG/DL (ref 5–25)
CALCIUM ALBUM COR SERPL-MCNC: 9.9 MG/DL (ref 8.3–10.1)
CALCIUM SERPL-MCNC: 8.7 MG/DL (ref 8.3–10.1)
CHLORIDE SERPL-SCNC: 103 MMOL/L (ref 96–108)
CO2 SERPL-SCNC: 24 MMOL/L (ref 21–32)
COCAINE UR QL: NEGATIVE
CREAT SERPL-MCNC: 0.63 MG/DL (ref 0.6–1.3)
ERYTHROCYTE [DISTWIDTH] IN BLOOD BY AUTOMATED COUNT: 15.1 % (ref 11.6–15.1)
FIBRINOGEN PPP-MCNC: 499 MG/DL (ref 227–495)
GFR SERPL CREATININE-BSD FRML MDRD: 114 ML/MIN/1.73SQ M
GLUCOSE SERPL-MCNC: 100 MG/DL (ref 65–140)
GLUCOSE SERPL-MCNC: 90 MG/DL (ref 65–140)
HCO3 BLDCOA-SCNC: 20 MMOL/L (ref 17.3–27.3)
HCO3 BLDCOV-SCNC: 16.3 MMOL/L (ref 12.2–28.6)
HCT VFR BLD AUTO: 30.8 % (ref 34.8–46.1)
HGB BLD-MCNC: 10.1 G/DL (ref 11.5–15.4)
INR PPP: 1.01 (ref 0.84–1.19)
MCH RBC QN AUTO: 31.3 PG (ref 26.8–34.3)
MCHC RBC AUTO-ENTMCNC: 32.8 G/DL (ref 31.4–37.4)
MCV RBC AUTO: 95 FL (ref 82–98)
METHADONE UR QL: NEGATIVE
O2 CT VFR BLDCOA CALC: 6.8 ML/DL
OPIATES UR QL SCN: NEGATIVE
OXYCODONE+OXYMORPHONE UR QL SCN: NEGATIVE
OXYHGB MFR BLDCOA: 25 %
OXYHGB MFR BLDCOV: 46.1 %
PCO2 BLDCOA: 81.1 MM[HG] (ref 30–60)
PCO2 BLDCOV: 52.8 MM HG (ref 27–43)
PCP UR QL: NEGATIVE
PH BLDCOA: 7.01 [PH] (ref 7.23–7.43)
PH BLDCOV: 7.11 [PH] (ref 7.19–7.49)
PLATELET # BLD AUTO: 378 THOUSANDS/UL (ref 149–390)
PLATELET # BLD AUTO: 378 THOUSANDS/UL (ref 149–390)
PMV BLD AUTO: 9.9 FL (ref 8.9–12.7)
PMV BLD AUTO: 9.9 FL (ref 8.9–12.7)
PO2 BLDCOA: 15.8 MM HG (ref 5–25)
PO2 BLDCOV: 23.8 MM HG (ref 15–45)
POTASSIUM SERPL-SCNC: 3.8 MMOL/L (ref 3.5–5.3)
PROT SERPL-MCNC: 7 G/DL (ref 6.4–8.4)
PROTHROMBIN TIME: 13.3 SECONDS (ref 11.6–14.5)
RBC # BLD AUTO: 3.23 MILLION/UL (ref 3.81–5.12)
RH BLD: POSITIVE
SAO2 % BLDCOV: 13.2 ML/DL
SODIUM SERPL-SCNC: 139 MMOL/L (ref 135–147)
SPECIMEN EXPIRATION DATE: NORMAL
THC UR QL: NEGATIVE
THROMBIN TIME: 14.7 SECONDS (ref 14.7–18.4)
WBC # BLD AUTO: 15.71 THOUSAND/UL (ref 4.31–10.16)

## 2022-11-06 PROCEDURE — 10907ZC DRAINAGE OF AMNIOTIC FLUID, THERAPEUTIC FROM PRODUCTS OF CONCEPTION, VIA NATURAL OR ARTIFICIAL OPENING: ICD-10-PCS | Performed by: STUDENT IN AN ORGANIZED HEALTH CARE EDUCATION/TRAINING PROGRAM

## 2022-11-06 PROCEDURE — 4A1HXCZ MONITORING OF PRODUCTS OF CONCEPTION, CARDIAC RATE, EXTERNAL APPROACH: ICD-10-PCS | Performed by: STUDENT IN AN ORGANIZED HEALTH CARE EDUCATION/TRAINING PROGRAM

## 2022-11-06 RX ORDER — FENTANYL CITRATE 50 UG/ML
INJECTION, SOLUTION INTRAMUSCULAR; INTRAVENOUS
Status: COMPLETED
Start: 2022-11-06 | End: 2022-11-06

## 2022-11-06 RX ORDER — SIMETHICONE 80 MG
80 TABLET,CHEWABLE ORAL 4 TIMES DAILY PRN
Status: DISCONTINUED | OUTPATIENT
Start: 2022-11-06 | End: 2022-11-09 | Stop reason: HOSPADM

## 2022-11-06 RX ORDER — OXYCODONE HYDROCHLORIDE 5 MG/1
10 TABLET ORAL EVERY 4 HOURS PRN
Status: DISCONTINUED | OUTPATIENT
Start: 2022-11-06 | End: 2022-11-09 | Stop reason: HOSPADM

## 2022-11-06 RX ORDER — OXYTOCIN/RINGER'S LACTATE 30/500 ML
PLASTIC BAG, INJECTION (ML) INTRAVENOUS
Status: COMPLETED
Start: 2022-11-06 | End: 2022-11-07

## 2022-11-06 RX ORDER — LEVOTHYROXINE SODIUM 0.05 MG/1
100 TABLET ORAL ONCE
Status: COMPLETED | OUTPATIENT
Start: 2022-11-06 | End: 2022-11-06

## 2022-11-06 RX ORDER — KETOROLAC TROMETHAMINE 30 MG/ML
15 INJECTION, SOLUTION INTRAMUSCULAR; INTRAVENOUS EVERY 6 HOURS SCHEDULED
Status: DISPENSED | OUTPATIENT
Start: 2022-11-07 | End: 2022-11-07

## 2022-11-06 RX ORDER — SODIUM CHLORIDE, SODIUM LACTATE, POTASSIUM CHLORIDE, CALCIUM CHLORIDE 600; 310; 30; 20 MG/100ML; MG/100ML; MG/100ML; MG/100ML
125 INJECTION, SOLUTION INTRAVENOUS CONTINUOUS
Status: DISCONTINUED | OUTPATIENT
Start: 2022-11-06 | End: 2022-11-06

## 2022-11-06 RX ORDER — FENTANYL CITRATE/PF 50 MCG/ML
25 SYRINGE (ML) INJECTION
Status: DISCONTINUED | OUTPATIENT
Start: 2022-11-06 | End: 2022-11-09 | Stop reason: HOSPADM

## 2022-11-06 RX ORDER — LIDOCAINE HYDROCHLORIDE 10 MG/ML
INJECTION, SOLUTION EPIDURAL; INFILTRATION; INTRACAUDAL; PERINEURAL
Status: COMPLETED | OUTPATIENT
Start: 2022-11-06 | End: 2022-11-06

## 2022-11-06 RX ORDER — PHENYLEPHRINE HYDROCHLORIDE 10 MG/ML
INJECTION INTRAVENOUS
Status: DISPENSED
Start: 2022-11-06 | End: 2022-11-07

## 2022-11-06 RX ORDER — BETAMETHASONE SODIUM PHOSPHATE AND BETAMETHASONE ACETATE 3; 3 MG/ML; MG/ML
12 INJECTION, SUSPENSION INTRA-ARTICULAR; INTRALESIONAL; INTRAMUSCULAR; SOFT TISSUE EVERY 24 HOURS
Status: DISCONTINUED | OUTPATIENT
Start: 2022-11-06 | End: 2022-11-06

## 2022-11-06 RX ORDER — ONDANSETRON 2 MG/ML
INJECTION INTRAMUSCULAR; INTRAVENOUS
Status: COMPLETED
Start: 2022-11-06 | End: 2022-11-06

## 2022-11-06 RX ORDER — DIAPER,BRIEF,INFANT-TODD,DISP
1 EACH MISCELLANEOUS DAILY PRN
Status: DISCONTINUED | OUTPATIENT
Start: 2022-11-06 | End: 2022-11-09 | Stop reason: HOSPADM

## 2022-11-06 RX ORDER — LIDOCAINE HYDROCHLORIDE AND EPINEPHRINE 20; 5 MG/ML; UG/ML
INJECTION, SOLUTION EPIDURAL; INFILTRATION; INTRACAUDAL; PERINEURAL AS NEEDED
Status: DISCONTINUED | OUTPATIENT
Start: 2022-11-06 | End: 2022-11-06

## 2022-11-06 RX ORDER — TRANEXAMIC ACID 100 MG/ML
INJECTION, SOLUTION INTRAVENOUS
Status: COMPLETED
Start: 2022-11-06 | End: 2022-11-06

## 2022-11-06 RX ORDER — CALCIUM CARBONATE 200(500)MG
1000 TABLET,CHEWABLE ORAL DAILY PRN
Status: DISCONTINUED | OUTPATIENT
Start: 2022-11-06 | End: 2022-11-06

## 2022-11-06 RX ORDER — OXYTOCIN/RINGER'S LACTATE 30/500 ML
62.5 PLASTIC BAG, INJECTION (ML) INTRAVENOUS ONCE
Status: COMPLETED | OUTPATIENT
Start: 2022-11-07 | End: 2022-11-07

## 2022-11-06 RX ORDER — IBUPROFEN 600 MG/1
600 TABLET ORAL EVERY 6 HOURS
Status: DISCONTINUED | OUTPATIENT
Start: 2022-11-08 | End: 2022-11-09 | Stop reason: HOSPADM

## 2022-11-06 RX ORDER — ACETAMINOPHEN 325 MG/1
650 TABLET ORAL EVERY 6 HOURS SCHEDULED
Status: DISCONTINUED | OUTPATIENT
Start: 2022-11-07 | End: 2022-11-09 | Stop reason: HOSPADM

## 2022-11-06 RX ORDER — TERBUTALINE SULFATE 1 MG/ML
INJECTION, SOLUTION SUBCUTANEOUS
Status: DISCONTINUED
Start: 2022-11-06 | End: 2022-11-07 | Stop reason: WASHOUT

## 2022-11-06 RX ORDER — DOCUSATE SODIUM 100 MG/1
100 CAPSULE, LIQUID FILLED ORAL 2 TIMES DAILY
Status: DISCONTINUED | OUTPATIENT
Start: 2022-11-07 | End: 2022-11-09 | Stop reason: HOSPADM

## 2022-11-06 RX ORDER — OXYTOCIN/RINGER'S LACTATE 30/500 ML
PLASTIC BAG, INJECTION (ML) INTRAVENOUS
Status: DISPENSED
Start: 2022-11-06 | End: 2022-11-07

## 2022-11-06 RX ORDER — DIPHENHYDRAMINE HCL 25 MG
25 TABLET ORAL EVERY 6 HOURS PRN
Status: DISCONTINUED | OUTPATIENT
Start: 2022-11-06 | End: 2022-11-09 | Stop reason: HOSPADM

## 2022-11-06 RX ORDER — DEXAMETHASONE SODIUM PHOSPHATE 4 MG/ML
4 INJECTION, SOLUTION INTRA-ARTICULAR; INTRALESIONAL; INTRAMUSCULAR; INTRAVENOUS; SOFT TISSUE ONCE
Status: COMPLETED | OUTPATIENT
Start: 2022-11-07 | End: 2022-11-07

## 2022-11-06 RX ORDER — PROPOFOL 10 MG/ML
INJECTION, EMULSION INTRAVENOUS AS NEEDED
Status: DISCONTINUED | OUTPATIENT
Start: 2022-11-06 | End: 2022-11-06

## 2022-11-06 RX ORDER — OXYCODONE HYDROCHLORIDE 5 MG/1
5 TABLET ORAL EVERY 4 HOURS PRN
Status: DISCONTINUED | OUTPATIENT
Start: 2022-11-06 | End: 2022-11-09 | Stop reason: HOSPADM

## 2022-11-06 RX ORDER — LEVOTHYROXINE SODIUM 0.05 MG/1
100 TABLET ORAL
Status: DISCONTINUED | OUTPATIENT
Start: 2022-11-07 | End: 2022-11-09 | Stop reason: HOSPADM

## 2022-11-06 RX ORDER — KETOROLAC TROMETHAMINE 30 MG/ML
INJECTION, SOLUTION INTRAMUSCULAR; INTRAVENOUS
Status: COMPLETED
Start: 2022-11-06 | End: 2022-11-07

## 2022-11-06 RX ORDER — METHYLERGONOVINE MALEATE 0.2 MG/ML
INJECTION INTRAVENOUS AS NEEDED
Status: DISCONTINUED | OUTPATIENT
Start: 2022-11-06 | End: 2022-11-06

## 2022-11-06 RX ORDER — SODIUM CHLORIDE, SODIUM LACTATE, POTASSIUM CHLORIDE, CALCIUM CHLORIDE 600; 310; 30; 20 MG/100ML; MG/100ML; MG/100ML; MG/100ML
125 INJECTION, SOLUTION INTRAVENOUS CONTINUOUS
Status: DISCONTINUED | OUTPATIENT
Start: 2022-11-07 | End: 2022-11-09 | Stop reason: HOSPADM

## 2022-11-06 RX ORDER — SUCCINYLCHOLINE/SOD CL,ISO/PF 100 MG/5ML
SYRINGE (ML) INTRAVENOUS AS NEEDED
Status: DISCONTINUED | OUTPATIENT
Start: 2022-11-06 | End: 2022-11-06

## 2022-11-06 RX ORDER — CALCIUM CARBONATE 200(500)MG
1000 TABLET,CHEWABLE ORAL DAILY PRN
Status: DISCONTINUED | OUTPATIENT
Start: 2022-11-06 | End: 2022-11-09 | Stop reason: HOSPADM

## 2022-11-06 RX ORDER — KETOROLAC TROMETHAMINE 30 MG/ML
INJECTION, SOLUTION INTRAMUSCULAR; INTRAVENOUS AS NEEDED
Status: DISCONTINUED | OUTPATIENT
Start: 2022-11-06 | End: 2022-11-06

## 2022-11-06 RX ORDER — PROMETHAZINE HYDROCHLORIDE 25 MG/ML
12.5 INJECTION, SOLUTION INTRAMUSCULAR; INTRAVENOUS ONCE
Status: DISCONTINUED | OUTPATIENT
Start: 2022-11-07 | End: 2022-11-09 | Stop reason: HOSPADM

## 2022-11-06 RX ORDER — ONDANSETRON 2 MG/ML
4 INJECTION INTRAMUSCULAR; INTRAVENOUS EVERY 8 HOURS PRN
Status: DISCONTINUED | OUTPATIENT
Start: 2022-11-06 | End: 2022-11-09 | Stop reason: HOSPADM

## 2022-11-06 RX ORDER — SODIUM CHLORIDE 9 MG/ML
125 INJECTION, SOLUTION INTRAVENOUS CONTINUOUS
Status: DISCONTINUED | OUTPATIENT
Start: 2022-11-06 | End: 2022-11-06

## 2022-11-06 RX ORDER — METHYLERGONOVINE MALEATE 0.2 MG/ML
INJECTION INTRAVENOUS
Status: DISPENSED
Start: 2022-11-06 | End: 2022-11-07

## 2022-11-06 RX ORDER — SUCCINYLCHOLINE/SOD CL,ISO/PF 100 MG/5ML
SYRINGE (ML) INTRAVENOUS
Status: COMPLETED
Start: 2022-11-06 | End: 2022-11-06

## 2022-11-06 RX ORDER — CEFAZOLIN SODIUM 2 G/50ML
2000 SOLUTION INTRAVENOUS ONCE
Status: COMPLETED | OUTPATIENT
Start: 2022-11-06 | End: 2022-11-06

## 2022-11-06 RX ORDER — PROPOFOL 10 MG/ML
INJECTION, EMULSION INTRAVENOUS
Status: COMPLETED
Start: 2022-11-06 | End: 2022-11-06

## 2022-11-06 RX ORDER — METOCLOPRAMIDE HYDROCHLORIDE 5 MG/ML
5 INJECTION INTRAMUSCULAR; INTRAVENOUS ONCE
Status: DISCONTINUED | OUTPATIENT
Start: 2022-11-07 | End: 2022-11-09 | Stop reason: HOSPADM

## 2022-11-06 RX ORDER — LIDOCAINE HYDROCHLORIDE AND EPINEPHRINE 20; 5 MG/ML; UG/ML
INJECTION, SOLUTION EPIDURAL; INFILTRATION; INTRACAUDAL; PERINEURAL
Status: COMPLETED
Start: 2022-11-06 | End: 2022-11-06

## 2022-11-06 RX ORDER — ONDANSETRON 2 MG/ML
4 INJECTION INTRAMUSCULAR; INTRAVENOUS ONCE AS NEEDED
Status: DISCONTINUED | OUTPATIENT
Start: 2022-11-06 | End: 2022-11-09 | Stop reason: HOSPADM

## 2022-11-06 RX ORDER — TRANEXAMIC ACID 100 MG/ML
INJECTION, SOLUTION INTRAVENOUS AS NEEDED
Status: DISCONTINUED | OUTPATIENT
Start: 2022-11-06 | End: 2022-11-06

## 2022-11-06 RX ORDER — ROPIVACAINE HYDROCHLORIDE 2 MG/ML
INJECTION, SOLUTION EPIDURAL; INFILTRATION; PERINEURAL CONTINUOUS PRN
Status: DISCONTINUED | OUTPATIENT
Start: 2022-11-06 | End: 2022-11-06

## 2022-11-06 RX ORDER — FENTANYL CITRATE 50 UG/ML
INJECTION, SOLUTION INTRAMUSCULAR; INTRAVENOUS AS NEEDED
Status: DISCONTINUED | OUTPATIENT
Start: 2022-11-06 | End: 2022-11-06

## 2022-11-06 RX ORDER — ONDANSETRON 2 MG/ML
4 INJECTION INTRAMUSCULAR; INTRAVENOUS EVERY 8 HOURS PRN
Status: DISCONTINUED | OUTPATIENT
Start: 2022-11-06 | End: 2022-11-06

## 2022-11-06 RX ORDER — OXYTOCIN/RINGER'S LACTATE 30/500 ML
PLASTIC BAG, INJECTION (ML) INTRAVENOUS CONTINUOUS PRN
Status: DISCONTINUED | OUTPATIENT
Start: 2022-11-06 | End: 2022-11-06

## 2022-11-06 RX ADMIN — BETAMETHASONE SODIUM PHOSPHATE AND BETAMETHASONE ACETATE 12 MG: 3; 3 INJECTION, SUSPENSION INTRA-ARTICULAR; INTRALESIONAL; INTRAMUSCULAR at 15:26

## 2022-11-06 RX ADMIN — LIDOCAINE HYDROCHLORIDE AND EPINEPHRINE 8 ML: 20; 5 INJECTION, SOLUTION EPIDURAL; INFILTRATION; INTRACAUDAL; PERINEURAL at 22:20

## 2022-11-06 RX ADMIN — SODIUM CHLORIDE 999 ML/HR: 0.9 INJECTION, SOLUTION INTRAVENOUS at 19:17

## 2022-11-06 RX ADMIN — SODIUM CHLORIDE 125 ML/HR: 0.9 INJECTION, SOLUTION INTRAVENOUS at 15:32

## 2022-11-06 RX ADMIN — LEVOTHYROXINE SODIUM 100 MCG: 50 TABLET ORAL at 17:19

## 2022-11-06 RX ADMIN — FENTANYL CITRATE 100 MCG: 50 INJECTION INTRAMUSCULAR; INTRAVENOUS at 22:32

## 2022-11-06 RX ADMIN — Medication 100 MG: at 22:22

## 2022-11-06 RX ADMIN — Medication 250 MILLI-UNITS/MIN: at 23:08

## 2022-11-06 RX ADMIN — METHYLERGONOVINE MALEATE 0.2 MG: 0.2 INJECTION, SOLUTION INTRAMUSCULAR; INTRAVENOUS at 22:45

## 2022-11-06 RX ADMIN — SODIUM CHLORIDE 2.5 MILLION UNITS: 9 INJECTION, SOLUTION INTRAVENOUS at 19:33

## 2022-11-06 RX ADMIN — Medication 25 MCG: at 23:58

## 2022-11-06 RX ADMIN — TRANEXAMIC ACID 1 G: 100 INJECTION, SOLUTION INTRAVENOUS at 22:38

## 2022-11-06 RX ADMIN — SODIUM CHLORIDE: 0.9 INJECTION, SOLUTION INTRAVENOUS at 22:32

## 2022-11-06 RX ADMIN — ONDANSETRON 4 MG: 2 INJECTION INTRAMUSCULAR; INTRAVENOUS at 22:32

## 2022-11-06 RX ADMIN — Medication 500 MG: at 22:32

## 2022-11-06 RX ADMIN — FENTANYL CITRATE 25 MCG: 50 INJECTION, SOLUTION INTRAMUSCULAR; INTRAVENOUS at 23:58

## 2022-11-06 RX ADMIN — Medication 250 MILLI-UNITS/MIN: at 22:25

## 2022-11-06 RX ADMIN — Medication 62.5 MILLI-UNITS/MIN: at 23:30

## 2022-11-06 RX ADMIN — PROPOFOL 180 MG: 10 INJECTION, EMULSION INTRAVENOUS at 22:22

## 2022-11-06 RX ADMIN — LIDOCAINE HYDROCHLORIDE AND EPINEPHRINE 8 ML: 20; 5 INJECTION, SOLUTION EPIDURAL; INFILTRATION; INTRACAUDAL; PERINEURAL at 22:17

## 2022-11-06 RX ADMIN — LIDOCAINE HYDROCHLORIDE 5 ML: 10 INJECTION, SOLUTION EPIDURAL; INFILTRATION; INTRACAUDAL; PERINEURAL at 19:23

## 2022-11-06 RX ADMIN — KETOROLAC TROMETHAMINE 30 MG: 30 INJECTION, SOLUTION INTRAMUSCULAR; INTRAVENOUS at 23:05

## 2022-11-06 RX ADMIN — CEFAZOLIN SODIUM 2000 MG: 2 SOLUTION INTRAVENOUS at 22:22

## 2022-11-06 RX ADMIN — ROPIVACAINE HYDROCHLORIDE 7 ML/HR: 2 INJECTION, SOLUTION EPIDURAL; INFILTRATION; PERINEURAL at 19:29

## 2022-11-06 RX ADMIN — ROPIVACAINE HYDROCHLORIDE: 2 INJECTION, SOLUTION EPIDURAL; INFILTRATION at 19:52

## 2022-11-06 RX ADMIN — SODIUM CHLORIDE 5 MILLION UNITS: 0.9 INJECTION, SOLUTION INTRAVENOUS at 15:26

## 2022-11-06 NOTE — ASSESSMENT & PLAN NOTE
Cervix 4/60/-2  Admit for observation   Will recheck in AM unless more uncomfortable overnight   GBS pending, Penicillin for GBS prophylaxis   Continuous fetal monitoring

## 2022-11-06 NOTE — ASSESSMENT & PLAN NOTE
[address] [facility] QBL 1004, intraop TXA and methergine  Hgb 10 1 -> 7 9 -> venofer ordered -> 7 7  S/p void trial, voiding appropriately   Kaycee/Tylenol/Motrin for pain   DVT ppx: BMI 27, SCDs  Contraception: desires micronor bridge to salpingectomy

## 2022-11-06 NOTE — CONSULTS
NICU Prenatal Consult   Mohsen Zavala 40 y o  female MRN: [id number]  Unit/Bed#: L&D 322-01 Encounter: [id number]    Consulting Physician: Denys Regan MD      A NICU Prenatal Consult has been requested by OB due to concern for  labor with vaginal bleeding (that has now improved)  Membranes are intact at this time  Mohsen Zavala is a 40 y o  [id number], with an KELLEY of 22 at 34w4d GA  Christina Medel is expecting a boy, to be named Clay Gee  Estimated fetal weight is 1694 (as of 10/21/22)  She has received a dose of betamethasone, to be followed by an additional dose in 24 hours if she remains pregnant  She has also been started on penicillin for GBS prophylaxis in the setting of GBS unknown  Pregnancy has been complicated by:  Hypothyroidism (on levothyroxine)  Abnormal 1hr GTT (did not get 3hr GTT)  Tobacco use  Fetal avascular Liver Cyst - seen at [facility], cleared for delivery locally  - right liver lobe just below the level of the diaphragm   - per mother's report, 2cm in size, does not compress the biliary tree  - mother plans to notify [facility] after delivery to arrange future  follow up     Mother has a history of 2 pregnancies with multiple fetal anomalies resulting in TABs at [address] 64 and 16wga  She has 2 healthy living children, ages 15 and 6 years  Prenatal Care:Yes, late to seek care, presented at 23wga       Prenatal Labs:  Lab Results   Component Value Date/Time    ABO Grouping A 2022 12:03 PM    Rh Factor Positive 2022 12:03 PM    Hepatitis B Surface Ag Non-reactive 2022 12:03 PM    Hepatitis C Ab Non-reactive 2022 12:03 PM    RPR Non-Reactive 2022 11:59 AM    Rubella IgG Quant 16 9 2022 12:03 PM    HIV-1/HIV-2 Ab Non-Reactive 2022 12:03 PM      22 14:51   Antibody Screen Negative     Unknown labs at this time: GBS    Pregnancy complications:   Patient Active Problem List   Diagnosis   • Acquired hypothyroidism   • Late prenatal care   • Pregnancy with poor obstetric history   • Hypothyroidism affecting pregnancy   • Previous child with congenital anomaly, currently pregnant, antepartum   • Encounter for supervision of normal pregnancy in multigravida in third trimester   • Pregnancy complicated by fetal abdominal abnormality   • 34 weeks gestation of pregnancy   • Abnormal maternal glucose tolerance, antepartum   • Request for sterilization   • Uterine size-date discrepancy in third trimester   • Vaginal bleeding during pregnancy     Maternal medical history:   Past Medical History:   Diagnosis Date   • Anemia    • Disease of thyroid gland    • Potassium (K) deficiency      Medications at home:   Medications Prior to Admission   Medication   • acetaminophen (TYLENOL) 325 mg tablet   • levothyroxine (Levoxyl) 100 mcg tablet   • Prenatal Vit-Iron Carbonyl-FA (prenatal multivitamin) TABS   • Pseudoephedrine-DM-GG-APAP (ROBITUSSIN COLD/COUGH/FLU PO)     Maternal social history:  Social History     Tobacco Use   • Smoking status: Current Every Day Smoker     Types: Cigarettes   • Smokeless tobacco: Never Used   • Tobacco comment: one pack a week   Substance Use Topics   • Alcohol use: Not Currently     I spoke with Antonia Castillo today regarding the upcoming birth of her son  We discussed the baby's possible medical problems and the specialized care needed to address these problems  This discussion included, but was not limited to:      Attendance of physician/GAYTARI, nursing, and/or respiratory therapist in the delivery and delivery room management , Risk of feedings problems and potential need for IV fluids or gavage tube feeds, Risk of hypoglycemia requiring formula feeding if breastmilk is unavailable or IV dextrose infusion, Risk of hypothermia and need for radiant warmer and/or isolette, Risk of immature cardiorespiratory control and need for monitoring and/or caffeine , Risk of jaundice requiring phototherapy, Risk of respiratory distress and possible need for support (oxygen, CPAP, intubation, and/or surfactant) and Risk of sepsis and possible need for lab tests and IV antibiotics    All of Beatriz's questions were answered to the best of my ability, and she was encouraged to contact us with any further questions  Thank you for including us in the care of [facility]  Please reach out to the on-call Neonatologist via [employer] for any further questions that may arise  I spent 20 minutes in consultation with [facility], of which >50% was in direct communication      [facility]

## 2022-11-06 NOTE — OB LABOR/OXYTOCIN SAFETY PROGRESS
Labor Progress Note - Miguel Angel Syed 40 y o  female MRN: [id number]    Unit/Bed#: L&D 322-01 Encounter: [id number]       Contraction Frequency (minutes): 3-5  Contraction Quality: Mild  Tachysystole: No   Cervical Dilation: 4        Cervical Effacement: 80  Fetal Station: -2  Baseline Rate: 130 bpm     FHR Category:  (pt sitting up 5496-4031)               Vital Signs:   Vitals:    11/06/22 1334   BP: 118/80   Pulse: 104   Resp: 16   Temp: 98 3 °F (36 8 °C)   SpO2: 97%       Notes/comments:   Patient now regularly frida, feeling more uncomfortable with her contractions, cervical change to 4/80/-2  Cervix was more effaced and stretchy with membranes at the os and bloody show  Will continue on penicillin, neonatology has evaluated her  Patient located received epidural when she desires  FHT with one prior late deceleration, however overall reactive with moderate variability Continue expectant management as long as FHT remains without decelerations       Dr Carmen Cox 11/6/2022 6:26 PM

## 2022-11-06 NOTE — H&P
Pr-[id number]  H  1 C/Eber Moseley Final 40 y o  female MRN: [id number]  Unit/Bed#: L&D 322-01 Encounter: [id number]    Assessment: 40 y o  [id number] at 34w4d admitted for observation in the setting of possible  labor and vaginal bleeding  Plan:   *  labor in third trimester  Assessment & Plan  Cervix 60/-2  Admit for observation   Will recheck in AM unless more uncomfortable overnight   GBS pending, Penicillin for GBS prophylaxis   Continuous fetal monitoring     34 weeks gestation of pregnancy  Assessment & Plan  Appreciate NICU consult  Betamethasone for fetal lung maturity -            Vaginal bleeding during pregnancy  Assessment & Plan  Bleeding minimal on speculum exam  Continue to monitor         Discussed case and plan w/ Dr Kimberley Matthews       Chief Complaint: vaginal bleeding    HPI: Bethanie Winters is a 40 y o  [id number] with an KELLEY of 2022, by Ultrasound at 34w4d who is being admitted for possible  labor in the setting of vaginal bleeding  She states that this afternoon, she was at home when she went to the restroom and had noticed bright red bleeding on her pad  She has occasional contractions and notes increased pelvic pressure  Denies leakage of fluid and notes good fetal movement  Obstetric history is significant for two prior term SVDs in  and 2012 without history of  labor       Patient Active Problem List   Diagnosis   • Acquired hypothyroidism   • Late prenatal care   • Pregnancy with poor obstetric history   • Hypothyroidism affecting pregnancy   • Previous child with congenital anomaly, currently pregnant, antepartum   • Encounter for supervision of normal pregnancy in multigravida in third trimester   • Pregnancy complicated by fetal abdominal abnormality   • 34 weeks gestation of pregnancy   • Abnormal maternal glucose tolerance, antepartum   • Request for sterilization   • Uterine size-date discrepancy in third trimester   • Vaginal bleeding during pregnancy   •  labor in third trimester       Baby complications/comments: none    Review of Systems   Constitutional: Negative for chills and fever  Eyes: Negative for visual disturbance  Respiratory: Negative for cough and shortness of breath  Cardiovascular: Negative for chest pain  Gastrointestinal: Negative for abdominal pain, diarrhea, nausea and vomiting  Genitourinary: Positive for vaginal bleeding  Negative for dysuria, hematuria, pelvic pain, vaginal discharge and vaginal pain  Neurological: Negative for dizziness, light-headedness and headaches  OB Hx:  OB History    Para Term  AB Living   6 2 2 0 3 2   SAB IAB Ectopic Multiple Live Births   1 2 0 1 2      # Outcome Date GA Lbr Kayode/2nd Weight Sex Delivery Anes PTL Lv   6 Current            5 SAB 2020 6w0d    SAB      4 IAB 2013 16w0d             Birth Comments: Multiple anomalies- reports only a head developed  D&C   3 Term 12 38w0d   M Vag-Spont EPI  PRACHI      Complications: Nuchal cord affecting delivery   2 Term 08/09/10 40w0d   M Vag-Spont EPI  PRACHI   1A IAB  21w0d   M          Birth Comments: multiple anomalies, termination D&C   1B IAB  21w0d   F          Birth Comments: multiple anomalies  termination  D&C       Past Medical Hx:  Past Medical History:   Diagnosis Date   • Anemia    • Disease of thyroid gland    • Potassium (K) deficiency          Allergies   Allergen Reactions   • Latex    • Morphine Irritability     Pt states she gets "angry and violent" when taking morphine         Medications Prior to Admission   Medication   • acetaminophen (TYLENOL) 325 mg tablet   • levothyroxine (Levoxyl) 100 mcg tablet   • Prenatal Vit-Iron Carbonyl-FA (prenatal multivitamin) TABS   • Pseudoephedrine-DM-GG-APAP (ROBITUSSIN COLD/COUGH/FLU PO)       Objective:  Temp:  [98 1 °F (36 7 °C)-98 3 °F (36 8 °C)] 98 3 °F (36 8 °C)  HR:  [] 104  Resp:  [16-18] 16  BP: (118-134)/(80-82) 118/80  Body mass index is 27 59 kg/m²  Physical Exam:  Physical Exam  Constitutional:       General: She is not in acute distress  Appearance: She is not ill-appearing or toxic-appearing  Genitourinary:      Vulva normal       Genitourinary Comments: Speculum revealed no active bleeding but pink tinged discharge       No vaginal discharge  HENT:      Head: Normocephalic and atraumatic  Pulmonary:      Effort: Pulmonary effort is normal  No respiratory distress  Breath sounds: No stridor  Abdominal:      Palpations: Abdomen is soft  Tenderness: There is no abdominal tenderness  There is no guarding  Comments: Gravid   Neurological:      General: No focal deficit present  Mental Status: She is alert and oriented to person, place, and time  Skin:     General: Skin is warm and dry  Psychiatric:         Mood and Affect: Mood normal          Thought Content:  Thought content normal          Judgment: Judgment normal           FHT:  Baseline Rate: 130 bpm  Variability: Moderate 6-25 bpm  Accelerations: 15 x 15 or greater  Decelerations: None  FHR Category:  (pt sitting up 3722-0437)    TOCO:   Contraction Frequency (minutes): 4-5  Contraction Duration (seconds): 50-70  Contraction Quality: Mild    Lab Results   Component Value Date    WBC 15 71 (H) 11/06/2022    HGB 10 1 (L) 11/06/2022    HCT 30 8 (L) 11/06/2022     11/06/2022     11/06/2022     Lab Results   Component Value Date    K 3 8 11/06/2022     11/06/2022    CO2 24 11/06/2022    BUN 11 11/06/2022    CREATININE 0 63 11/06/2022    AST 11 11/06/2022    ALT 14 11/06/2022     Prenatal Labs: Reviewed      Blood type: A positive   Antibody: Negative  GBS: Pending   HIV:  Negative  Rubella: Immune  VDRL/RPR: Non reactive  HBsAg: Negative  Chlamydia: Negative  Gonorrhea: Negative  Diabetes 1 hour screen: 170  3 hour glucose:  Not yet done  Platelets: 668  Hgb: 10 1    >2 Midnights  OBSERVATION    Signature/Title: Davon Morillo Mac  Date: 11/6/2022  Time: 6:01 PM

## 2022-11-07 PROBLEM — D64.9 ANEMIA: Status: ACTIVE | Noted: 2022-11-07

## 2022-11-07 PROBLEM — O60.03 PRETERM LABOR IN THIRD TRIMESTER: Status: RESOLVED | Noted: 2022-11-06 | Resolved: 2022-11-07

## 2022-11-07 PROBLEM — E87.6 POTASSIUM (K) DEFICIENCY: Status: ACTIVE | Noted: 2022-11-07

## 2022-11-07 PROBLEM — O35.DXX0: Status: ACTIVE | Noted: 2022-08-24

## 2022-11-07 PROBLEM — Z98.891 STATUS POST PRIMARY LOW TRANSVERSE CESAREAN SECTION: Status: ACTIVE | Noted: 2022-08-31

## 2022-11-07 PROBLEM — O09.299 HISTORY OF PREGNANCY LOSS IN PRIOR PREGNANCY, CURRENTLY PREGNANT: Status: ACTIVE | Noted: 2022-08-24

## 2022-11-07 PROBLEM — O45.90 PLACENTAL ABRUPTION, DELIVERED: Status: ACTIVE | Noted: 2022-11-06

## 2022-11-07 LAB
BASOPHILS # BLD AUTO: 0.08 THOUSANDS/ÂΜL (ref 0–0.1)
BASOPHILS NFR BLD AUTO: 0 % (ref 0–1)
C TRACH DNA SPEC QL NAA+PROBE: NEGATIVE
EOSINOPHIL # BLD AUTO: 0 THOUSAND/ÂΜL (ref 0–0.61)
EOSINOPHIL NFR BLD AUTO: 0 % (ref 0–6)
ERYTHROCYTE [DISTWIDTH] IN BLOOD BY AUTOMATED COUNT: 15.2 % (ref 11.6–15.1)
ERYTHROCYTE [DISTWIDTH] IN BLOOD BY AUTOMATED COUNT: 15.6 % (ref 11.6–15.1)
HCT VFR BLD AUTO: 23.5 % (ref 34.8–46.1)
HCT VFR BLD AUTO: 24.3 % (ref 34.8–46.1)
HGB BLD-MCNC: 7.7 G/DL (ref 11.5–15.4)
HGB BLD-MCNC: 7.9 G/DL (ref 11.5–15.4)
IMM GRANULOCYTES # BLD AUTO: 0.25 THOUSAND/UL (ref 0–0.2)
IMM GRANULOCYTES NFR BLD AUTO: 1 % (ref 0–2)
LYMPHOCYTES # BLD AUTO: 2.56 THOUSANDS/ÂΜL (ref 0.6–4.47)
LYMPHOCYTES NFR BLD AUTO: 9 % (ref 14–44)
MCH RBC QN AUTO: 32 PG (ref 26.8–34.3)
MCH RBC QN AUTO: 32.1 PG (ref 26.8–34.3)
MCHC RBC AUTO-ENTMCNC: 32.5 G/DL (ref 31.4–37.4)
MCHC RBC AUTO-ENTMCNC: 32.8 G/DL (ref 31.4–37.4)
MCV RBC AUTO: 98 FL (ref 82–98)
MCV RBC AUTO: 98 FL (ref 82–98)
MONOCYTES # BLD AUTO: 0.94 THOUSAND/ÂΜL (ref 0.17–1.22)
MONOCYTES NFR BLD AUTO: 3 % (ref 4–12)
N GONORRHOEA DNA SPEC QL NAA+PROBE: NEGATIVE
NEUTROPHILS # BLD AUTO: 26.08 THOUSANDS/ÂΜL (ref 1.85–7.62)
NEUTS SEG NFR BLD AUTO: 87 % (ref 43–75)
NRBC BLD AUTO-RTO: 0 /100 WBCS
PLATELET # BLD AUTO: 292 THOUSANDS/UL (ref 149–390)
PLATELET # BLD AUTO: 324 THOUSANDS/UL (ref 149–390)
PMV BLD AUTO: 9.8 FL (ref 8.9–12.7)
PMV BLD AUTO: 9.8 FL (ref 8.9–12.7)
RBC # BLD AUTO: 2.4 MILLION/UL (ref 3.81–5.12)
RBC # BLD AUTO: 2.47 MILLION/UL (ref 3.81–5.12)
RPR SER QL: NORMAL
WBC # BLD AUTO: 26.28 THOUSAND/UL (ref 4.31–10.16)
WBC # BLD AUTO: 29.91 THOUSAND/UL (ref 4.31–10.16)

## 2022-11-07 RX ADMIN — IBUPROFEN 600 MG: 600 TABLET, FILM COATED ORAL at 23:55

## 2022-11-07 RX ADMIN — ACETAMINOPHEN 650 MG: 325 TABLET ORAL at 23:55

## 2022-11-07 RX ADMIN — KETOROLAC TROMETHAMINE 15 MG: 30 INJECTION, SOLUTION INTRAMUSCULAR; INTRAVENOUS at 05:01

## 2022-11-07 RX ADMIN — KETOROLAC TROMETHAMINE 15 MG: 30 INJECTION, SOLUTION INTRAMUSCULAR at 05:01

## 2022-11-07 RX ADMIN — ACETAMINOPHEN 650 MG: 325 TABLET ORAL at 12:55

## 2022-11-07 RX ADMIN — ACETAMINOPHEN 650 MG: 325 TABLET ORAL at 17:28

## 2022-11-07 RX ADMIN — DOCUSATE SODIUM 100 MG: 100 CAPSULE, LIQUID FILLED ORAL at 11:01

## 2022-11-07 RX ADMIN — LEVOTHYROXINE SODIUM 100 MCG: 50 TABLET ORAL at 06:22

## 2022-11-07 RX ADMIN — SODIUM CHLORIDE, SODIUM LACTATE, POTASSIUM CHLORIDE, AND CALCIUM CHLORIDE 125 ML/HR: .6; .31; .03; .02 INJECTION, SOLUTION INTRAVENOUS at 04:22

## 2022-11-07 RX ADMIN — DOCUSATE SODIUM 100 MG: 100 CAPSULE, LIQUID FILLED ORAL at 17:28

## 2022-11-07 RX ADMIN — KETOROLAC TROMETHAMINE 15 MG: 30 INJECTION, SOLUTION INTRAMUSCULAR; INTRAVENOUS at 17:28

## 2022-11-07 RX ADMIN — SIMETHICONE 80 MG: 80 TABLET, CHEWABLE ORAL at 16:28

## 2022-11-07 RX ADMIN — IRON SUCROSE 200 MG: 20 INJECTION, SOLUTION INTRAVENOUS at 11:01

## 2022-11-07 RX ADMIN — OXYCODONE 5 MG: 5 TABLET ORAL at 04:23

## 2022-11-07 RX ADMIN — ACETAMINOPHEN 650 MG: 325 TABLET ORAL at 06:23

## 2022-11-07 RX ADMIN — ACETAMINOPHEN 650 MG: 325 TABLET ORAL at 00:37

## 2022-11-07 RX ADMIN — DEXAMETHASONE SODIUM PHOSPHATE 4 MG: 4 INJECTION INTRA-ARTICULAR; INTRALESIONAL; INTRAMUSCULAR; INTRAVENOUS; SOFT TISSUE at 00:13

## 2022-11-07 RX ADMIN — KETOROLAC TROMETHAMINE 15 MG: 30 INJECTION, SOLUTION INTRAMUSCULAR; INTRAVENOUS at 11:14

## 2022-11-07 NOTE — ANESTHESIA PROCEDURE NOTES
Epidural Block    Patient location during procedure: OB  Start time: 11/6/2022 7:21 PM  Reason for block: procedure for pain and at surgeon's request  Staffing  Performed: Anesthesiologist   Anesthesiologist: Ramon Zaragoza DO  Preanesthetic Checklist  Completed: patient identified, IV checked, site marked, risks and benefits discussed, surgical consent, monitors and equipment checked, pre-op evaluation and timeout performed  Epidural  Patient position: sitting  Prep: Betadine  Patient monitoring: cardiac monitor and frequent blood pressure checks  Approach: midline  Location: lumbar  Injection technique: HONORIO air and HONORIO saline  Needle  Needle type: Tuohy   Needle gauge: 18 G  Catheter type: side hole  Catheter size: 20 G  Catheter securement method: surgical tape  Test dose: negativelidocaine (PF) (XYLOCAINE-MPF) 1 % - Infiltration   5 mL - 11/6/2022 7:23:00 PM  Assessment  Sensory level: T10  Number of attempts: 1negative aspiration for CSF, negative aspiration for heme and no paresthesia on injection  patient tolerated the procedure well with no immediate complications

## 2022-11-07 NOTE — PLAN OF CARE
Problem: Nutrition/Hydration-ADULT  Goal: Nutrient/Hydration intake appropriate for improving, restoring or maintaining nutritional needs  Description: Monitor and assess patient's nutrition/hydration status for malnutrition  Collaborate with interdisciplinary team and initiate plan and interventions as ordered  Monitor patient's weight and dietary intake as ordered or per policy  Utilize nutrition screening tool and intervene as necessary  Determine patient's food preferences and provide high-protein, high-caloric foods as appropriate       INTERVENTIONS:  - Monitor oral intake, urinary output, labs, and treatment plans  - Assess nutrition and hydration status and recommend course of action  - Evaluate amount of meals eaten  - Assist patient with eating if necessary   - Allow adequate time for meals  - Recommend/ encourage appropriate diets, oral nutritional supplements, and vitamin/mineral supplements  - Order, calculate, and assess calorie counts as needed  - Recommend, monitor, and adjust tube feedings and TPN/PPN based on assessed needs  - Assess need for intravenous fluids  - Provide specific nutrition/hydration education as appropriate  - Include patient/family/caregiver in decisions related to nutrition  Outcome: Progressing     Problem: POSTPARTUM  Goal: Experiences normal postpartum course  Description: INTERVENTIONS:  - Monitor maternal vital signs  - Assess uterine involution and lochia  Outcome: Progressing  Goal: Appropriate maternal -  bonding  Description: INTERVENTIONS:  - Identify family support  - Assess for appropriate maternal/infant bonding   -Encourage maternal/infant bonding opportunities  - Referral to  or  as needed  Outcome: Progressing  Goal: Establishment of infant feeding pattern  Description: INTERVENTIONS:  - Assess breast/bottle feeding  - Refer to lactation as needed  Outcome: Progressing  Goal: Incision(s), wounds(s) or drain site(s) healing without S/S of infection  Description: INTERVENTIONS  - Assess and document dressing, incision, wound bed, drain sites and surrounding tissue  - Provide patient and family education  - Perform skin care/dressing changes every  Outcome: Progressing

## 2022-11-07 NOTE — ANESTHESIA PREPROCEDURE EVALUATION
Procedure:  LABOR ANALGESIA    Relevant Problems   ENDO   (+) Acquired hypothyroidism   (+) Hypothyroidism affecting pregnancy      GYN   (+) 34 weeks gestation of pregnancy   (+) Encounter for supervision of normal pregnancy in multigravida in third trimester        Physical Exam    Airway    Mallampati score: II  TM Distance: >3 FB  Neck ROM: full     Dental       Cardiovascular  Rhythm: regular, Rate: normal, Cardiovascular exam normal    Pulmonary  Pulmonary exam normal Breath sounds clear to auscultation,     Other Findings  Poor  Dentition       Anesthesia Plan  ASA Score- 3     Anesthesia Type- epidural with ASA Monitors  Additional Monitors:   Airway Plan:           Plan Factors-Exercise tolerance (METS): >4 METS  Chart reviewed  Existing labs reviewed  Patient is not a current smoker  Obstructive sleep apnea risk education given perioperatively  Induction- intravenous  Postoperative Plan-     Informed Consent- Anesthetic plan and risks discussed with patient

## 2022-11-07 NOTE — OP NOTE
OPERATIVE REPORT  PATIENT NAME: Waldron Favre    :  1985  MRN: [id number]  Pt Location: [facility] L&[facility] OR ROOM 01    SURGERY DATE: 2022    Surgeon(s) and Role:     Gorge Vazquez MD - Primary     * Chiara Gore MD - Assisting    Preop Diagnosis:  Pregnancy at 34w4d   labor  Vaginal bleeding in 3rd trimester  Anemia    Postop Diagnosis:  Same, delivered  Placental abruption    Procedure(s) (LRB):   SECTION () (N/A)     Brief History  Waldron Favre is a 40 y o  [id number] at 34w5d admitted for vaginal bleeding and concern for developing  labor  She was given a dose of betamethasone and started on penicillin due to her GBS unknown status  She was managed expectantly until she became more uncomfortable and was noted to make cervical change  Vaginal bleeding was noted to become heavier with passage of clots  She then received an epidural for analgesia  During this time, fetal heart tracing was noted to have occasional late decelerations which improved with repositioning  She continued to make cervical change but then had a prolonged late deceleration and a large amount of vaginal bleeding  Due to the quantity of bleeding, high suspicion for abruption, and category 2 fetal heart tracing we proceeded to the OR in an emergent fashion for a 1LTCS  Operative Indications:  Primary low transverse  section for category 2 fetal heart tracing in the setting of suspected placental abruption    Attending Surgeon: Dr Severino Mclaughlin  Resident Surgeon: Dr Chiara Gore    Operative Findings:  1  Viable male  at 34w4d with APGARs of 6 and 8 at 1 and 5 minutes  Fetus weighted 4lb 7 6oz   2  Bloody amniotic fluid  3  Placenta with a normally inserted 3VC, delivered intact however with evidence of previously  membranes and dark red blood consistent with abruption  4  Normal uterus, bilateral tubes and ovaries  5   Extension of the hysterotomy into the left broad ligament/uterine artery  6  Adhesions absent  Umbilical Cord Venous Blood Gas:  Results from last 7 days   Lab Units 11/06/22  2226   PH COV  7 107*   PCO2 COV mm HG 52 8*   HCO3 COV mmol/L 16 3   BASE EXC COV mmol/L -13 8*   O2 CT CD VB mL/dL 13 2   O2 HGB, VENOUS CORD % 87 7     Umbilical Cord Arterial Blood Gas:  Results from last 7 days   Lab Units 11/06/22  2226   PH COA  7 009*   PCO2 COA  81 1*   PO2 COA mm HG 15 8   HCO3 COA mmol/L 20 0   BASE EXC COA mmol/L -13 6*   O2 CONTENT CORD ART ml/dl 6 8   O2 HGB, ARTERIAL CORD % 25 0       Operative Technique  The patient was taken to the operating room  Epidural anesthesia was rebolused and Ancef and Azithromycin were given for preoperative prophylaxis  The patient was then placed in the dorsal supine position with a left tilt of the hips  Fetal heart tones at this time were noted to be in the 110s so we proceeded in an urgent fashion  The patient was then prepped with betadine for vaginal prep and a Mahmood catheter was placed  ChloraPrep was then used for the abdomen and allowed to dry prior to draping the patient for a Pfannenstiel skin incision  A time out was performed to confirm correct patient and correct procedure  Due to inability to achieve adequate pain control, patient then underwent general anesthesia and intubation  A Pfannenstiel incision was made and carried down through the underlying subcutaneous tissue to the fascia using a scalpel  Rectus fascia was then incised  We then proceeded in Abbe-Sam fashion  All anatomic layers were well-demarcated  The rectus muscles were  and the peritoneum was identified, entered, and extended longitudinally with blunt dissection  The bladder blade was inserted and a transverse incision was made in the lower uterine segment using a new surgical blade  The uterine incision was extended cephalad with blunt dissection and the amniotic sac was entered      The surgeon's hand was placed into the uterine cavity  The fetal head was identified and elevated through the uterine incision with the assistance of fundal pressure  With gentle traction, the shoulder was delivered, followed by the rest of the fetal body  There was a loop of nuchal cord noted around the neck which was reduced  On delivery the cord was found to be wrapped around the trunk and was then  doubly clamped and cut    The infant was then passed off the table to the awaiting  staff  Venous and arterial blood gas, cord blood, and portion of cord was obtained for analysis and routine blood testing  The placenta was delivered intact with evidence of abruption so it was sent to pathology  Oxytocin was administered by IV infusion to enhance uterine contraction  The uterus was exteriorized and cleared of all clots and remaining products of conception  The uterine incision was re approximated using an 0 Vicryl in a running locked fashion  The extension into the left broad/uterine artery was able to be incorporated into the closure  Due to increased bleeding, TXA was given for prophylaxis  A second horizontal imbricating stitch with the same suture was applied  An area of denuded serosa over the extension was reapproximated using 2-0 Vicryl  The uterine incision was examined and noted to be hemostatic  The posterior cul-de-sac was cleared of all clots and products of conception  Methergine was given due to some persistent uterine atony  The uterus was replaced into the abdomen and the paracolic gutters were cleared of all clots  The uterine incision was once again reexamined and noted to be hemostatic  The fascia was re approximated using an 0 Vicryl in a running nonlocked fashion  The subcutaneous tissue was irrigated and cleared of all clots and debris  Good hemostasis was noted with Bovie electrocautery  The subcutaneous tissue was reapproximated with 3-0 Vicryl  The skin incision was closed using 4-0 Monocryl with exofin  Good hemostasis was noted  Patient tolerated the procedure well  All needle, sponge, and instrument counts were noted to be correct x 2 at the end of the procedure  Patient was transferred to postpartum in stable condition  Dr Mohamud Rob was present and participated in the entire surgery            SIGNATURE: Wilfred Cole MD  DATE: November 7, 2022  TIME: 12:43 AM

## 2022-11-07 NOTE — OB LABOR/OXYTOCIN SAFETY PROGRESS
Labor Progress Note - Jori Gregory 40 y o  female MRN: [id number]    Unit/Bed#: L&D 322-01 Encounter: [id number]       Contraction Frequency (minutes): 4 (difficulty picking up on patient's side)  Contraction Quality: Moderate  Tachysystole: No   Cervical Dilation: 7-8        Cervical Effacement: 90  Fetal Station: -1  Baseline Rate: 135 bpm (spotty tracing)     FHR Category:  (pt sitting up 5156-3747)               Vital Signs:   Vitals:    11/06/22 2034   BP:    Pulse: 66   Resp:    Temp:    SpO2: 96%       Notes/comments:  Continuing to make cervical change with a tense amniotic sac felt on exam, having spontaneous decelerations responsive to position changes however moderate variability maintained, bleeding remains stable, will continue to monitor closely        Tiffani Bernabe 11/6/2022 9:27 PM

## 2022-11-07 NOTE — OB LABOR/OXYTOCIN SAFETY PROGRESS
Labor Progress Note - Kris Sruthi 40 y o  female MRN: [id number]    Unit/Bed#: L&D 322-01 Encounter: [id number]       Contraction Frequency (minutes): 4-6  Contraction Quality: Mild  Tachysystole: No   Cervical Dilation: 5        Cervical Effacement: 90  Fetal Station: -1  Baseline Rate: 130 bpm     FHR Category:  (pt sitting up 3503-3763)               Vital Signs:   Vitals:    22 1334   BP: 118/80   Pulse: 104   Resp: 16   Temp: 98 3 °F (36 8 °C)   SpO2: 97%       Notes/comments:  Alerted by nursing the patient had a large gush of bright red blood when getting up to use the bathroom, once back in bed cervical exam notable for 5 cm dilated for well as very thin and anterior, no active bleeding noted at that time, amniotic sac tense, patient breathing through contractions, would like her epidural, discussed concern for abruption and possibility of needing an emergent  section if fetal heart tracing worsens, will reach out anesthesia for epidural placement and additionally place a 2nd IV in case of continued or worsening bleeding, will type and cross for 2 units,  continuing to monitor closely    Miguel Gambino 2022 7:12 PM

## 2022-11-07 NOTE — DISCHARGE SUMMARY
Discharge Summary - Antonia Castillo 40 y o  female MRN: [id number]    Unit/Bed#: LD OR  Encounter: [id number]    Admission Date: 2022     Discharge Date: 22     Admitting Attending: Gurpreet Haywood Attending: Haseeb Sultana  Discharge Attending: Dr Ever Dimas    Diagnosis:  Pregnancy at 34w4d   labor  Vaginal bleeding in 3rd trimester  Anemia  Placental abruption    Procedures: primary low transverse  section     Complications: none apparent     Antonia Castillo is a 40 y o  [id number] with an KELLEY of Estimated Date of Delivery: 22 at 34w4d who was admitted for vaginal bleeding and concern for developing  labor  She was given a dose of betamethasone and started on penicillin due to her GBS unknown status  She was managed expectantly until she became more uncomfortable and was noted to make cervical change  Vaginal bleeding was noted to become heavier with passage of clots  She then received an epidural for analgesia  During this time, fetal heart tracing was noted to have occasional late decelerations which improved with repositioning  She continued to make cervical change but then had a prolonged late deceleration and a large amount of vaginal bleeding  Due to the quantity of bleeding, high suspicion for abruption, and category 2 fetal heart tracing we proceeded to the OR in an emergent fashion for a 1LTCS  She underwent an uncomplicated  delivery  She received TXA and methergine intraoperatively due to some SORAIDA atony and hemorrhage  She delivered a viable male  at 31 75 62 on 22  Weight was 4lbs 7 6oz with APGARs of 6 and 8 at 1 and 5 minutes  Baby was cared for in the NICU and remained admitted at the time of Beatriz's discharge  Total QBL was 1444cc  Her preoperative Hb was 10 1  Her postoperative Hb was 7 7  Her postoperative course was uncomplicated       Condition at discharge: good     On day of discharge pain was well controlled, patient was tolerating PO, passing flatus  She was discharged with standard post partum/ post operative instructions to follow up with her physician in 1 week for an incision check and in 3-6 weeks for a postpartum appointment  Discharge instructions/Information to patient and family:   -Do not place anything (no partner, tampons or douche) in your vagina for 6 weeks  -You may walk for exercise for the first 6 weeks then gradually return to your usual activities    -Please do not drive for 1 week if you have no stitches and for 2 weeks if you have stitches or underwent a  delivery     -You may take baths or shower per your preference    -Please look at your bust (breasts) in the mirror daily and call for redness or tenderness or increased warmth    -Please call us for temperature > 100 4*F or 38* C, worsening pain or a foul discharge  Discharge Medications:   Prenatal vitamin daily for 6 months or the duration of nursing whichever is longer  Motrin 600 mg orally every 6 hours as needed for pain  Tylenol (over the counter) per bottle directions as needed for pain: do NOT use with percocet  Hydrocortisone cream 1% (over the counter) applied 1-2x daily to hemorrhoids as needed  Oxycodone as needed    Provisions for Follow-Up Care: Follow up with your doctor in 1 week for incision site check       Planned Readmission: no

## 2022-11-07 NOTE — LACTATION NOTE
This note was copied from a baby's chart  CONSULT - LACTATION  Baby Boy Teja Tiwari) Darryl 1 days male MRN: [id number]    [facility] Room / Bed: NICU /NICU 30 Encounter: [id number]    Maternal Information     MOTHER:  Marielos Howe  Maternal Age: 40 y o    OB History: # 1A - Date: , Sex: Male, Weight: None, GA: 21w0d, Delivery: None, Apgar1: None, Apgar5: None, Living: None, Birth Comments: multiple anomalies, termination D&C  # 1B - Date: , Sex: Female, Weight: None, GA: 21w0d, Delivery: None, Apgar1: None, Apgar5: None, Living: None, Birth Comments: multiple anomalies  termination  D&C    # 2 - Date: 08/09/10, Sex: Male, Weight: None, GA: 40w0d, Delivery: Vaginal, Spontaneous, Apgar1: None, Apgar5: None, Living: Living, Birth Comments: None    # 3 - Date: 12, Sex: Male, Weight: None, GA: 38w0d, Delivery: Vaginal, Spontaneous, Apgar1: None, Apgar5: None, Living: Living, Birth Comments: None    # 4 - Date: 2013, Sex: None, Weight: None, GA: 16w0d, Delivery: None, Apgar1: None, Apgar5: None, Living: None, Birth Comments: Multiple anomalies- reports only a head developed  D&C    # 5 - Date: 2020, Sex: None, Weight: None, GA: 6w0d, Delivery: Spontaneous , Apgar1: None, Apgar5: None, Living: None, Birth Comments: None    # 6 - Date: 22, Sex: Male, Weight: 2030 g (4 lb 7 6 oz), GA: 34w4d, Delivery: , Low Transverse, Apgar1: 6, Apgar5: 8, Living: Living, Birth Comments: None   Previouse breast reduction surgery? No    Lactation history:   Has patient previously breast fed: How long had patient previously breast fed:     Previous breast feeding complications:     No past surgical history on file       Birth information:  YOB: 2022   Time of birth: 10:25 PM   Sex: male   Delivery type: , Low Transverse   Birth Weight: 2030 g (4 lb 7 6 oz)   Percent of Weight Change: 0%     Gestational Age: 31w1d [unfilled]    Assessment     Breast and nipple assessment: not assessed at this time     Assessment: sleepy    Feeding assessment: no latch    Feeding recommendations:  breast feed on demand        22 1500   Lactation Consultation   Reason for Consult 10;10 minute   Risk Factors NICU infant   Maternal Information   Has mother  before? No  (Wants to breastfeed due to fear of formula shortage)   Breasts/Nipples   Intervention Breast pump   Breastfeeding Status Yes   Breastfeeding Progress Not yet established; Other issues (comment)  (Infant with NG tube  latching some and pumping)   Reasons for not Breastfeeding Infant medical condition   Patient Follow-Up   Lactation Consult Status 2   Follow-Up Type Inpatient;Call as needed   Other OB Lactation Documentation    Additional Problem Noted Deisy Alaniz observed in NICU with Maribel Medina (adult) and Luis Daniel Ayala (baby)  Reviewed RSB and NICU packet  Reviewed cycling of breast pump  Pumping:   - When pumping, begin in stimulation mode (high cycle, low vacuum) until milk begins to express  Change pump to expression mode (low cycle, high vacuum)  Use hands on pumping techniques to assist with milk transfer  When milk stops expressing, change back to stimulation mode  When milk begins to flow, change to expression mode  You make cycle pump up to three times in a pumping session  Instructions given on pumping  Discussed when to start, frequency, different pumps available versus manual expression  Met with mother  Provided mother with Ready, Set, Baby booklet which contained information on:  Hand expression with access to QR codes to review hand expression  Positioning and latch reviewed as well as showing images of other feeding positions  Discussed the properties of a good latch in any position     Feeding on cue and what that means for recognizing infant's hunger, s/s that baby is getting enough milk and some s/s that breastfeeding dyad may need further help  Skin to Skin contact an benefits to mom and baby  Avoidance of pacifiers for the first month discussed  Gave information on common concerns, what to expect the first few weeks after delivery, preparing for other caregivers, and how partners can help  Resources for support also provided  Mom provided with and discussed RBS, Hand expression/2nd night handout and increase supply for NICU baby  Reviewed pumping log and expectations for pumping output in the first week  Reviewed cycle pumping and appropriate pump settings, as well as pumping for 10-15 min 8-12 times per day  Enc Mom to discussed putting baby to the breast with the NICU team when baby is medically stable to do so  Enc her to call for lactation support as needed throughout her stay  Encouraged parents to call for assistance, questions, and concerns about breastfeeding  Extension provided        Denise Pugh RN 11/7/2022 3:06 PM

## 2022-11-07 NOTE — CASE MANAGEMENT
Case Management Assessment    Patient name Bridget Jain  [facility] L&D 315/L&D 540-61 MRN [id number]  : 1985 Date 2022       Current Admission Date: 2022  Current Admission Diagnosis:Status post primary low transverse  section   Patient Active Problem List    Diagnosis Date Noted   • Potassium (K) deficiency 2022   • Anemia 2022   • Postpartum hemorrhage 2022   • Placental abruption, delivered 2022   • Uterine size-date discrepancy in third trimester 10/21/2022   • Abnormal maternal glucose tolerance, antepartum 2022   • Request for sterilization 2022   • Status post primary low transverse  section 2022   • Pregnancy complicated by fetal GI abnormality 2022   • History of pregnancy loss in prior pregnancy, currently pregnant 2022   • Pregnancy complicated by fetal abdominal abnormality 2022   • Previous child with congenital anomaly, currently pregnant, antepartum 2022   • Encounter for supervision of normal pregnancy in multigravida in third trimester 2022   • Late prenatal care 2022   • Pregnancy with poor obstetric history 2022   • Hypothyroidism affecting pregnancy 2022   • Acquired hypothyroidism 2020      LOS (days): 1  Geometric Mean LOS (GMLOS) (days):   Days to GMLOS:     OBJECTIVE:    Risk of Unplanned Readmission Score: 5 53         Current admission status: Inpatient       Preferred Pharmacy:   [facility]/pharmacy #[id number], [place] - [address]  [address] [place] [address]  Phone: [phone] Fax: [phone]    Primary Care Provider: Denilson Nelson DO    Primary Insurance: April Schaefer  Secondary Insurance:     ASSESSMENT:  Leyla Carter Proxies    There are no active Health Care Proxies on file         Consult(s):  NICU baby     · Delivery method/date:  22    · Age: Gestational age 31w1d  · Admitted to NICU for maternal abruption and Category 2 fetal heart strip, resulting in an urgent C/S    SW met w/MOB who provided the following information:      · Baby's name/gender: Baby Boy Darryl  · Mother of baby: Tayla Causey  · Father of baby//SO: Alice Pascual  · Other Legal Guardian(s) for baby: No  · Alternate emergency contact: No  · Other children: MOB has 2 other children - 15 y/o and 9 y/o  · Lives with: MOB, FOB, MOB's parents, MOB's uncle, older children  · Support System: Family, friends  · Baby Supplies: Reports having all except car seat - family is working on this while MOB is in the hospital  · Bottle or Breast Feeding: both  · Breast Pump if breast feeding: TBD  · Government Assistance Programs/WIC/EBT/SSI: No  · Work/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), FOB works FT  · Transportation: FOB  · Pediatrician: Juan Ramon  · [address] Hx or Treatment: MOB reports anxiety, no official diagnosis  Does not take meds or see therapist  Not interested in [facility] specific resources at this time  · Substance Abuse: MOB denies  · Hx DV/IPV: MOB denies  · Community Referrals/C&Y/NFP: MOB denies    · Insurance: [address] [place] [address]  · NICU Resources and packet: given  · [facility] [facility]:  given     SW reviewed discharge planning process including the following: identifying caregivers at home, preference for d/c planning needs, availability of [facility] to Bed program, availability of treatment team to discuss questions or concerns patient and/or family may have regarding diagnosis, plan of care, old or new medications and discharge planning   SW will continue to follow for care coordination and update assessment as appropriate       SW will follow infant in NICU through dc

## 2022-11-07 NOTE — ANESTHESIA POSTPROCEDURE EVALUATION
Post-Op Assessment Note    CV Status:  Stable  Pain Score: 0    Pain management: adequate     Mental Status:  Alert and awake   Hydration Status:  Euvolemic and stable   PONV Controlled:  Controlled   Airway Patency:  Patent      Post Op Vitals Reviewed: Yes      Staff: Anesthesiologist     Post-op block assessment: site cleaned, pain and no complications      No complications documented      BP      Temp      Pulse     Resp      SpO2      /55 (BP Location: Left arm)   Pulse 72   Temp 98 3 °F (36 8 °C) (Oral)   Resp 16   Ht 4' 10" (1 473 m)   Wt 59 9 kg (132 lb)   LMP  (LMP Unknown)   SpO2 96%   Breastfeeding Yes   BMI 27 59 kg/m²

## 2022-11-07 NOTE — PROGRESS NOTES
Progress Note - OB/GYN  Ricardo Canseco 40 y o  female MRN: [id number]  Unit/Bed#: L&D 315-01 Encounter: [id number]    Assessment and Plan     Ricardo Canseco is a patient of: [facility]  She is POD# 1 s/p Primary  section  Recovering well and is stable       Postpartum hemorrhage  Assessment & Plan  QBL 1004 mL   S/p TXA and methergine     Hypothyroidism affecting pregnancy  Assessment & Plan  Continue levothyroxine 100 mcg     * Status post primary low transverse  section  Assessment & Plan  PPH QBL 1004, intraop TXA and methergine  Hgb 10 1 -> 7 9 -> venofer ordered   Davis: UOP 1 1 cc/kg/hr, will plan for davis removal this AM, f/u void trial   Kaycee/Tylenol/Motrin for pain   DVT ppx: BMI 27, SCDs      Disposition    - Anticipate discharge home on POD# 2-4      Subjective/Objective     Chief Complaint: Postpartum State     Subjective:    Ricardo Canseco is POD#1 s/p Primary  section under general anesthesia  She has no current complaints  Pain is well controlled  Patient is currently voiding, davis in place  She is ambulating  Patient is currently passing flatus and has had no bowel movement  She is tolerating PO, and denies nausea or vomitting  Patient denies fever, chills, chest pain, shortness of breath, or calf tenderness  Lochia is normal  She is  Using breast pump  She is recovering well and is stable  Vitals:   /55 (BP Location: Left arm)   Pulse 72   Temp 98 3 °F (36 8 °C) (Oral)   Resp 16   Ht 4' 10" (1 473 m)   Wt 59 9 kg (132 lb)   LMP  (LMP Unknown)   SpO2 96%   Breastfeeding Yes   BMI 27 59 kg/m²       Intake/Output Summary (Last 24 hours) at 2022 0638  Last data filed at 2022 0430  Gross per 24 hour   Intake 200 ml   Output 1879 ml   Net -1679 ml       Invasive Devices  Timeline    Peripheral Intravenous Line  Duration           Peripheral IV 22 Distal;Dorsal (posterior); Right Forearm <1 day    Peripheral IV 22 Dorsal (posterior); Left Hand <1 day          Drain  Duration           Urethral Catheter Non-latex 16 Fr  <1 day                Physical Exam:   GEN: Shirlene Banks appears well, alert and oriented x 3, pleasant and cooperative   CARDIO: Regular rate  RESP:  Normal effort   ABDOMEN: soft, no tenderness, no distention, fundus @ umbillicus, Incision C/D/I  EXTREMITIES: SCDs on, non tender      Labs:     Hemoglobin   Date Value Ref Range Status   11/07/2022 7 9 (L) 11 5 - 15 4 g/dL Final   11/06/2022 10 1 (L) 11 5 - 15 4 g/dL Final     WBC   Date Value Ref Range Status   11/07/2022 29 91 (H) 4 31 - 10 16 Thousand/uL Final   11/06/2022 15 71 (H) 4 31 - 10 16 Thousand/uL Final     Platelets   Date Value Ref Range Status   11/07/2022 292 149 - 390 Thousands/uL Final   11/06/2022 378 149 - 390 Thousands/uL Final   11/06/2022 378 149 - 390 Thousands/uL Final     Creatinine   Date Value Ref Range Status   11/06/2022 0 63 0 60 - 1 30 mg/dL Final     Comment:     Standardized to IDMS reference method     AST   Date Value Ref Range Status   11/06/2022 11 5 - 45 U/L Final     Comment:     Specimen collection should occur prior to Sulfasalazine administration due to the potential for falsely depressed results  ALT   Date Value Ref Range Status   11/06/2022 14 12 - 78 U/L Final     Comment:     Specimen collection should occur prior to Sulfasalazine administration due to the potential for falsely depressed results             [facility]  11/7/2022  6:38 AM

## 2022-11-08 LAB — GP B STREP DNA SPEC QL NAA+PROBE: POSITIVE

## 2022-11-08 RX ADMIN — LEVOTHYROXINE SODIUM 100 MCG: 50 TABLET ORAL at 05:54

## 2022-11-08 RX ADMIN — DOCUSATE SODIUM 100 MG: 100 CAPSULE, LIQUID FILLED ORAL at 18:01

## 2022-11-08 RX ADMIN — DOCUSATE SODIUM 100 MG: 100 CAPSULE, LIQUID FILLED ORAL at 08:48

## 2022-11-08 RX ADMIN — ACETAMINOPHEN 650 MG: 325 TABLET ORAL at 18:01

## 2022-11-08 RX ADMIN — ACETAMINOPHEN 650 MG: 325 TABLET ORAL at 05:54

## 2022-11-08 RX ADMIN — IBUPROFEN 600 MG: 600 TABLET, FILM COATED ORAL at 05:55

## 2022-11-08 RX ADMIN — IBUPROFEN 600 MG: 600 TABLET, FILM COATED ORAL at 18:01

## 2022-11-08 RX ADMIN — ACETAMINOPHEN 650 MG: 325 TABLET ORAL at 12:08

## 2022-11-08 RX ADMIN — IBUPROFEN 600 MG: 600 TABLET, FILM COATED ORAL at 12:08

## 2022-11-08 NOTE — LACTATION NOTE
This note was copied from a baby's chart  11/08/22 1300   Lactation Consultation   Reason for Consult 10 m   Breasts/Nipples   Intervention Hand expression   Breastfeeding Status Yes   Breastfeeding Progress Other issues (comment); Pumping only  (latching in NICU and hand expression )   Reasons for not Breastfeeding Infant medical condition   Patient Follow-Up   Lactation Consult Status 2   Follow-Up Type Inpatient;Call as needed   Other OB Lactation Documentation    Additional Problem Noted Breast pump ordered out patient  Baby Darrell Kaiser latching in NICU  [place] c/o difficulty with hand expression as far as collecting milk and missing the bottle  Offered medicine cups and expression spoon as more shallow options to allow for easier capture of EBM

## 2022-11-08 NOTE — PLAN OF CARE
Problem: PAIN - ADULT  Goal: Verbalizes/displays adequate comfort level or baseline comfort level  Description: Interventions:  - Encourage patient to monitor pain and request assistance  - Assess pain using appropriate pain scale  - Administer analgesics based on type and severity of pain and evaluate response  - Implement non-pharmacological measures as appropriate and evaluate response  - Consider cultural and social influences on pain and pain management  - Notify physician/advanced practitioner if interventions unsuccessful or patient reports new pain  Outcome: Progressing     Problem: INFECTION - ADULT  Goal: Absence or prevention of progression during hospitalization  Description: INTERVENTIONS:  - Assess and monitor for signs and symptoms of infection  - Monitor lab/diagnostic results  - Monitor all insertion sites, i e  indwelling lines, tubes, and drains  - Monitor endotracheal if appropriate and nasal secretions for changes in amount and color  - [place] appropriate cooling/warming therapies per order  - Administer medications as ordered  - Instruct and encourage patient and family to use good hand hygiene technique  - Identify and instruct in appropriate isolation precautions for identified infection/condition  Outcome: Progressing  Goal: Absence of fever/infection during neutropenic period  Description: INTERVENTIONS:  - Monitor WBC    Outcome: Progressing     Problem: SAFETY ADULT  Goal: Patient will remain free of falls  Description: INTERVENTIONS:  - Educate patient/family on patient safety including physical limitations  - Instruct patient to call for assistance with activity   - Consult OT/PT to assist with strengthening/mobility   - Keep Call bell within reach  - Keep bed low and locked with side rails adjusted as appropriate  - Keep care items and personal belongings within reach  - Initiate and maintain comfort rounds  - Make Fall Risk Sign visible to staff  - Offer Toileting every  Hours, in advance of need  - Initiate/Maintain alarm  - Obtain necessary fall risk management equipment:   - Apply yellow socks and bracelet for high fall risk patients  - Consider moving patient to room near nurses station  Outcome: Progressing  Goal: Maintain or return to baseline ADL function  Description: INTERVENTIONS:  -  Assess patient's ability to carry out ADLs; assess patient's baseline for ADL function and identify physical deficits which impact ability to perform ADLs (bathing, care of mouth/teeth, toileting, grooming, dressing, etc )  - Assess/evaluate cause of self-care deficits   - Assess range of motion  - Assess patient's mobility; develop plan if impaired  - Assess patient's need for assistive devices and provide as appropriate  - Encourage maximum independence but intervene and supervise when necessary  - Involve family in performance of ADLs  - Assess for home care needs following discharge   - Consider OT consult to assist with ADL evaluation and planning for discharge  - Provide patient education as appropriate  Outcome: Progressing  Goal: Maintains/Returns to pre admission functional level  Description: INTERVENTIONS:  - Perform BMAT or MOVE assessment daily    - Set and communicate daily mobility goal to care team and patient/family/caregiver  - Collaborate with rehabilitation services on mobility goals if consulted  - Perform Range of Motion  times a day  - Reposition patient every  hours    - Dangle patient  times a day  - Stand patient  times a day  - Ambulate patient  times a day  - Out of bed to chair times a day   - Out of bed for meals times a day  - Out of bed for toileting  - Record patient progress and toleration of activity level   Outcome: Progressing     Problem: Knowledge Deficit  Goal: Patient/family/caregiver demonstrates understanding of disease process, treatment plan, medications, and discharge instructions  Description: Complete learning assessment and assess knowledge base   Interventions:  - Provide teaching at level of understanding  - Provide teaching via preferred learning methods  Outcome: Progressing     Problem: DISCHARGE PLANNING  Goal: Discharge to home or other facility with appropriate resources  Description: INTERVENTIONS:  - Identify barriers to discharge w/patient and caregiver  - Arrange for needed discharge resources and transportation as appropriate  - Identify discharge learning needs (meds, wound care, etc )  - Arrange for interpretive services to assist at discharge as needed  - Refer to Case Management Department for coordinating discharge planning if the patient needs post-hospital services based on physician/advanced practitioner order or complex needs related to functional status, cognitive ability, or social support system  Outcome: Progressing

## 2022-11-08 NOTE — PROGRESS NOTES
Progress Note - OB/GYN  Bridget Jain 40 y o  female MRN: [id number]  Unit/Bed#: L&D 315-01 Encounter: [id number]    Assessment and Plan     Bridget Jain is a patient of: [facility]  She is POD# 2 s/p Primary  section  Recovering well and is stable       Postpartum hemorrhage  Assessment & Plan  QBL 1004 mL   S/p TXA and methergine     Hypothyroidism affecting pregnancy  Assessment & Plan  Continue levothyroxine 100 mcg     * Status post primary low transverse  section  Assessment & Plan  PPH QBL 1004, intraop TXA and methergine  Hgb 10 1 -> 7 9 -> venofer ordered -> 7 7  S/p void trial, voiding appropriately   Kaycee/Tylenol/Motrin for pain   DVT ppx: BMI 27, SCDs  Contraception: desires micronor bridge to salpingectomy       Disposition    - Anticipate discharge home on POD# 4      Subjective/Objective     Chief Complaint: Postpartum State     Subjective:    Bridget Jain is POD#2 s/p Primary  section  She has no current complaints  Pain is well controlled  Patient is currently voiding  She is ambulating  Patient is currently passing flatus and has had bowel movement  She is tolerating PO, and denies nausea or vomitting  Patient denies fever, chills, chest pain, shortness of breath, or calf tenderness  Lochia is normal  She is  Using breast pump  She is recovering well and is stable  Reports that baby is still in NICU but doing well  She would like to stay until POD #4         Vitals:   /56 (BP Location: Left arm)   Pulse 97   Temp 97 8 °F (36 6 °C) (Oral)   Resp 18   Ht 4' 10" (1 473 m)   Wt 59 9 kg (132 lb)   LMP  (LMP Unknown)   SpO2 97%   Breastfeeding Yes   BMI 27 59 kg/m²       Intake/Output Summary (Last 24 hours) at 2022 0644  Last data filed at 2022 0203  Gross per 24 hour   Intake --   Output 1175 ml   Net -1175 ml       Invasive Devices  Timeline    Peripheral Intravenous Line  Duration           Peripheral IV 22 Distal;Dorsal (posterior); Right Forearm 1 day                Physical Exam:   GEN: Lovely Moser appears well, alert and oriented x 3, pleasant and cooperative   CARDIO: normal effort   RESP:  Normal effort   ABDOMEN: soft, no tenderness, no distention, fundus @ umbillicus, Incision C/D/I  EXTREMITIES: nontender      Labs:     Hemoglobin   Date Value Ref Range Status   11/07/2022 7 7 (L) 11 5 - 15 4 g/dL Final   11/07/2022 7 9 (L) 11 5 - 15 4 g/dL Final     WBC   Date Value Ref Range Status   11/07/2022 26 28 (H) 4 31 - 10 16 Thousand/uL Final   11/07/2022 29 91 (H) 4 31 - 10 16 Thousand/uL Final     Platelets   Date Value Ref Range Status   11/07/2022 324 149 - 390 Thousands/uL Final   11/07/2022 292 149 - 390 Thousands/uL Final     Creatinine   Date Value Ref Range Status   11/06/2022 0 63 0 60 - 1 30 mg/dL Final     Comment:     Standardized to IDMS reference method     AST   Date Value Ref Range Status   11/06/2022 11 5 - 45 U/L Final     Comment:     Specimen collection should occur prior to Sulfasalazine administration due to the potential for falsely depressed results  ALT   Date Value Ref Range Status   11/06/2022 14 12 - 78 U/L Final     Comment:     Specimen collection should occur prior to Sulfasalazine administration due to the potential for falsely depressed results             Eugene Rowell  11/8/2022  6:44 AM

## 2022-11-08 NOTE — PLAN OF CARE
Problem: PAIN - ADULT  Goal: Verbalizes/displays adequate comfort level or baseline comfort level  Description: Interventions:  - Encourage patient to monitor pain and request assistance  - Assess pain using appropriate pain scale  - Administer analgesics based on type and severity of pain and evaluate response  - Implement non-pharmacological measures as appropriate and evaluate response  - Consider cultural and social influences on pain and pain management  - Notify physician/advanced practitioner if interventions unsuccessful or patient reports new pain  11/8/2022 0724 by René Li RN  Outcome: Progressing  11/8/2022 0724 by René Li RN  Outcome: Progressing     Problem: Knowledge Deficit  Goal: Patient/family/caregiver demonstrates understanding of disease process, treatment plan, medications, and discharge instructions  Description: Complete learning assessment and assess knowledge base  Interventions:  - Provide teaching at level of understanding  - Provide teaching via preferred learning methods  11/8/2022 0724 by René Li RN  Outcome: Progressing  11/8/2022 0724 by René Li RN  Outcome: Progressing     Problem: Knowledge Deficit  Goal: Patient/family/caregiver demonstrates understanding of disease process, treatment plan, medications, and discharge instructions  Description: Complete learning assessment and assess knowledge base    Interventions:  - Provide teaching at level of understanding  - Provide teaching via preferred learning methods  11/8/2022 0724 by René Li RN  Outcome: Progressing  11/8/2022 0724 by René Li RN  Outcome: Progressing     Problem: DISCHARGE PLANNING  Goal: Discharge to home or other facility with appropriate resources  Description: INTERVENTIONS:  - Identify barriers to discharge w/patient and caregiver  - Arrange for needed discharge resources and transportation as appropriate  - Identify discharge learning needs (meds, wound care, etc )  - Arrange for interpretive services to assist at discharge as needed  - Refer to Case Management Department for coordinating discharge planning if the patient needs post-hospital services based on physician/advanced practitioner order or complex needs related to functional status, cognitive ability, or social support system  11/8/2022 0724 by Yakov Amador RN  Outcome: Progressing  11/8/2022 0724 by Yakov Amador RN  Outcome: Progressing

## 2022-11-09 VITALS
RESPIRATION RATE: 18 BRPM | HEART RATE: 83 BPM | BODY MASS INDEX: 27.71 KG/M2 | OXYGEN SATURATION: 99 % | TEMPERATURE: 98.5 F | DIASTOLIC BLOOD PRESSURE: 56 MMHG | SYSTOLIC BLOOD PRESSURE: 101 MMHG | WEIGHT: 132 LBS | HEIGHT: 58 IN

## 2022-11-09 RX ORDER — ACETAMINOPHEN 325 MG/1
650 TABLET ORAL EVERY 6 HOURS PRN
Qty: 30 TABLET | Refills: 0 | Status: SHIPPED | OUTPATIENT
Start: 2022-11-09 | End: 2022-12-09

## 2022-11-09 RX ORDER — ACETAMINOPHEN AND CODEINE PHOSPHATE 120; 12 MG/5ML; MG/5ML
1 SOLUTION ORAL DAILY
Qty: 28 TABLET | Refills: 2 | Status: SHIPPED | OUTPATIENT
Start: 2022-11-09 | End: 2023-02-01

## 2022-11-09 RX ORDER — IBUPROFEN 600 MG/1
600 TABLET ORAL EVERY 6 HOURS PRN
Qty: 30 TABLET | Refills: 0 | Status: SHIPPED | OUTPATIENT
Start: 2022-11-09

## 2022-11-09 RX ORDER — DOCUSATE SODIUM 100 MG/1
100 CAPSULE, LIQUID FILLED ORAL 2 TIMES DAILY
Qty: 30 CAPSULE | Refills: 0 | Status: SHIPPED | OUTPATIENT
Start: 2022-11-09

## 2022-11-09 RX ORDER — OXYCODONE HYDROCHLORIDE 5 MG/1
5 TABLET ORAL EVERY 4 HOURS PRN
Qty: 5 TABLET | Refills: 0 | Status: SHIPPED | OUTPATIENT
Start: 2022-11-09 | End: 2022-11-18 | Stop reason: ALTCHOICE

## 2022-11-09 RX ADMIN — IBUPROFEN 600 MG: 600 TABLET, FILM COATED ORAL at 06:01

## 2022-11-09 RX ADMIN — IBUPROFEN 600 MG: 600 TABLET, FILM COATED ORAL at 12:32

## 2022-11-09 RX ADMIN — ACETAMINOPHEN 650 MG: 325 TABLET ORAL at 06:03

## 2022-11-09 RX ADMIN — IBUPROFEN 600 MG: 600 TABLET, FILM COATED ORAL at 00:22

## 2022-11-09 RX ADMIN — LEVOTHYROXINE SODIUM 100 MCG: 50 TABLET ORAL at 06:01

## 2022-11-09 RX ADMIN — ACETAMINOPHEN 650 MG: 325 TABLET ORAL at 00:22

## 2022-11-09 RX ADMIN — ACETAMINOPHEN 650 MG: 325 TABLET ORAL at 12:32

## 2022-11-09 NOTE — PROGRESS NOTES
Save your life magnet and discharge handouts given to pt  Discharge teaching completed  Questions and concerns answered

## 2022-11-09 NOTE — PROGRESS NOTES
Progress Note - OB/GYN  Waldron Favre 40 y o  female MRN: [id number]  Unit/Bed#: L&D 315-01 Encounter: [id number]    Assessment and Plan     Waldron Favre is a patient of: [facility]  She is POD# 3 s/p Primary  section under general anesthesia in the setting of placental abruption  Recovering well and is stable       Postpartum hemorrhage  Assessment & Plan  QBL 1004 mL   S/p TXA and methergine     Hypothyroidism affecting pregnancy  Assessment & Plan  Continue levothyroxine 100 mcg     * Status post primary low transverse  section  Assessment & Plan  PPH QBL 1004, intraop TXA and methergine  Hgb 10 1 -> 7 9 -> venofer ordered -> 7 7  S/p void trial, voiding appropriately   Kaycee/Tylenol/Motrin for pain   DVT ppx: BMI 27, SCDs  Contraception: desires micronor bridge to salpingectomy       Disposition    - Anticipate discharge home on POD# 4      Subjective/Objective     Chief Complaint: Postpartum State     Subjective:    Waldron Favre is POD#3 s/p Spontaneous vaginal delivery  Primary  section under general anesthesia in the setting of placental abruption  She has no current complaints  Pain is well controlled  Patient is currently voiding  She is ambulating  Patient is currently passing flatus and has had bowel movement  She is tolerating PO, and denies nausea or vomitting  Patient denies fever, chills, chest pain, shortness of breath, or calf tenderness  Lochia is normal  She is hand expressing and using breast pump  She is recovering well and is stable  Reports baby is doing well in NICU  Very hopeful that baby may be able to be discharged before December        Vitals:   /54 (BP Location: Left arm)   Pulse 86   Temp 99 9 °F (37 7 °C) (Oral)   Resp 18   Ht 4' 10" (1 473 m)   Wt 59 9 kg (132 lb)   LMP  (LMP Unknown)   SpO2 99%   Breastfeeding Yes   BMI 27 59 kg/m²       Intake/Output Summary (Last 24 hours) at 2022 1474  Last data filed at 2022 1754  Gross per 24 hour   Intake --   Output 950 ml   Net -950 ml       Invasive Devices  Timeline    None                 Physical Exam:   GEN: Umm Solis appears well, alert and oriented x 3, pleasant and cooperative   CARDIO: Regular rate  RESP:  Normal effort  ABDOMEN: soft, no tenderness, no distention, fundus @ umbillicus, Incision C/D/I  EXTREMITIES: non-tender    Labs:     Hemoglobin   Date Value Ref Range Status   11/07/2022 7 7 (L) 11 5 - 15 4 g/dL Final   11/07/2022 7 9 (L) 11 5 - 15 4 g/dL Final     WBC   Date Value Ref Range Status   11/07/2022 26 28 (H) 4 31 - 10 16 Thousand/uL Final   11/07/2022 29 91 (H) 4 31 - 10 16 Thousand/uL Final     Platelets   Date Value Ref Range Status   11/07/2022 324 149 - 390 Thousands/uL Final   11/07/2022 292 149 - 390 Thousands/uL Final     Creatinine   Date Value Ref Range Status   11/06/2022 0 63 0 60 - 1 30 mg/dL Final     Comment:     Standardized to IDMS reference method     AST   Date Value Ref Range Status   11/06/2022 11 5 - 45 U/L Final     Comment:     Specimen collection should occur prior to Sulfasalazine administration due to the potential for falsely depressed results  ALT   Date Value Ref Range Status   11/06/2022 14 12 - 78 U/L Final     Comment:     Specimen collection should occur prior to Sulfasalazine administration due to the potential for falsely depressed results             Alan Rowell  11/9/2022  6:23 AM

## 2022-11-09 NOTE — PLAN OF CARE
Problem: PAIN - ADULT  Goal: Verbalizes/displays adequate comfort level or baseline comfort level  Description: Interventions:  - Encourage patient to monitor pain and request assistance  - Assess pain using appropriate pain scale  - Administer analgesics based on type and severity of pain and evaluate response  - Implement non-pharmacological measures as appropriate and evaluate response  - Consider cultural and social influences on pain and pain management  - Notify physician/advanced practitioner if interventions unsuccessful or patient reports new pain  Outcome: Progressing     Problem: INFECTION - ADULT  Goal: Absence or prevention of progression during hospitalization  Description: INTERVENTIONS:  - Assess and monitor for signs and symptoms of infection  - Monitor lab/diagnostic results  - Monitor all insertion sites, i e  indwelling lines, tubes, and drains  - [place] appropriate cooling/warming therapies per order  - Administer medications as ordered  - Instruct and encourage patient and family to use good hand hygiene technique  - Identify and instruct in appropriate isolation precautions for identified infection/condition  Outcome: Progressing  Goal: Absence of fever/infection during neutropenic period  Description: INTERVENTIONS:  - Monitor WBC    Outcome: Progressing     Problem: SAFETY ADULT  Goal: Patient will remain free of falls  Description: INTERVENTIONS:  - Educate patient/family on patient safety including physical limitations  - Instruct patient to call for assistance with activity   - Consult OT/PT to assist with strengthening/mobility   - Keep Call bell within reach  - Keep bed low and locked with side rails adjusted as appropriate  - Keep care items and personal belongings within reach  - Initiate and maintain comfort rounds  Outcome: Progressing  Goal: Maintain or return to baseline ADL function  Description: INTERVENTIONS:  -  Assess patient's ability to carry out ADLs; assess patient's baseline for ADL function and identify physical deficits which impact ability to perform ADLs (bathing, care of mouth/teeth, toileting, grooming, dressing, etc )  - Assess/evaluate cause of self-care deficits   - Assess range of motion  - Assess patient's mobility; develop plan if impaired  - Assess patient's need for assistive devices and provide as appropriate  - Encourage maximum independence but intervene and supervise when necessary  - Involve family in performance of ADLs  - Assess for home care needs following discharge   - Consider OT consult to assist with ADL evaluation and planning for discharge  - Provide patient education as appropriate  Outcome: Progressing  Goal: Maintains/Returns to pre admission functional level  Description: INTERVENTIONS:  - Perform BMAT or MOVE assessment daily    - Set and communicate daily mobility goal to care team and patient/family/caregiver  - Collaborate with rehabilitation services on mobility goals if consulted  - Record patient progress and toleration of activity level   Outcome: Progressing     Problem: Knowledge Deficit  Goal: Patient/family/caregiver demonstrates understanding of disease process, treatment plan, medications, and discharge instructions  Description: Complete learning assessment and assess knowledge base    Interventions:  - Provide teaching at level of understanding  - Provide teaching via preferred learning methods  Outcome: Progressing     Problem: DISCHARGE PLANNING  Goal: Discharge to home or other facility with appropriate resources  Description: INTERVENTIONS:  - Identify barriers to discharge w/patient and caregiver  - Arrange for needed discharge resources and transportation as appropriate  - Identify discharge learning needs (meds, wound care, etc )  - Arrange for interpretive services to assist at discharge as needed  - Refer to Case Management Department for coordinating discharge planning if the patient needs post-hospital services based on physician/advanced practitioner order or complex needs related to functional status, cognitive ability, or social support system  Outcome: Progressing

## 2022-11-09 NOTE — DISCHARGE INSTRUCTIONS

## 2022-11-10 LAB
ABO GROUP BLD BPU: NORMAL
ABO GROUP BLD BPU: NORMAL
BPU ID: NORMAL
BPU ID: NORMAL
CROSSMATCH: NORMAL
CROSSMATCH: NORMAL
UNIT DISPENSE STATUS: NORMAL
UNIT DISPENSE STATUS: NORMAL
UNIT PRODUCT CODE: NORMAL
UNIT PRODUCT CODE: NORMAL
UNIT PRODUCT VOLUME: 350 ML
UNIT PRODUCT VOLUME: 350 ML
UNIT RH: NORMAL
UNIT RH: NORMAL

## 2022-11-10 NOTE — UTILIZATION REVIEW
NOTIFICATION OF INPATIENT ADMISSION   MATERNITY/DELIVERY AUTHORIZATION REQUEST   SERVICING FACILITY:   [address] [place] [address] - L&D, , NICU  [address] [place] [address], [facility], [facility]  Tax ID: [id number]  NPI: [id number] ATTENDING PROVIDER:  Attending Name and NPI#: Julietteenzo Kyle  Kushal, [address]  Address: [address] [place] [address], [facility], [facility]  Phone: [phone]     ADMISSION INFORMATION:  Place of Service: [address]patient [address] [address]  Place of Service Code: 21  Inpatient Admission Date/Time: 22 10:11 PM  Discharge Date/Time: 2022  3:37 PM  Admitting Diagnosis Code/Description:  Vaginal bleeding during pregnancy [O46 90]     Mother: Mohsen Zavala 1985 Estimated Date of Delivery: 22  Delivering clinician: Denys Hawk    OB History        6    Para   3    Term   2       1    AB   3    Living   3       SAB   1    IAB   2    Ectopic   0    Multiple   1    Live Births   3               Baskin Name & MRN:   Information for the patient's :  Arelis Quant Horacio Kaplan) [[id number]]     Baskin Delivery Information:  Sex: male  Delivered 2022 10:25 PM by , Low Transverse; Gestational Age: 31w1d    Baskin Measurements:  Weight: 4 lb 7 6 oz (2030 g); Height: 16 93"    APGAR 1 minute 5 minutes 10 minutes   Totals: 6 8      Baskin Birth Information: 40 y o  female MRN: [id number] Unit/Bed#: L&D 315-01   Birthweight: No birth weight on file  Gestational Age: <None> Delivery Type:    APGARS Totals:        UTILIZATION REVIEW CONTACT:  Gabe Dill Utilization   Network Utilization Review Department  Phone: [phone]  Fax [phone]  Email: [email or web address]  Contact for approvals/pending authorizations, clinical reviews, and discharge       PHYSICIAN ADVISORY SERVICES:  Medical Necessity Denial & [employer] Review  Phone: [phone]  Fax: [phone]  Email: [email or web address]

## 2022-11-18 ENCOUNTER — POSTPARTUM VISIT (OUTPATIENT)
Dept: OBGYN CLINIC | Facility: CLINIC | Age: 37
End: 2022-11-18

## 2022-11-18 VITALS
HEART RATE: 69 BPM | SYSTOLIC BLOOD PRESSURE: 110 MMHG | BODY MASS INDEX: 25.73 KG/M2 | DIASTOLIC BLOOD PRESSURE: 62 MMHG | HEIGHT: 58 IN | OXYGEN SATURATION: 97 % | TEMPERATURE: 98.6 F | WEIGHT: 122.6 LBS

## 2022-11-18 DIAGNOSIS — Z30.2 REQUEST FOR STERILIZATION: ICD-10-CM

## 2022-11-18 DIAGNOSIS — Z98.891 STATUS POST PRIMARY LOW TRANSVERSE CESAREAN SECTION: Primary | ICD-10-CM

## 2022-11-18 PROBLEM — Z34.83 ENCOUNTER FOR SUPERVISION OF NORMAL PREGNANCY IN MULTIGRAVIDA IN THIRD TRIMESTER: Status: RESOLVED | Noted: 2022-08-16 | Resolved: 2022-11-18

## 2022-11-18 PROBLEM — O26.843 UTERINE SIZE-DATE DISCREPANCY IN THIRD TRIMESTER: Status: RESOLVED | Noted: 2022-10-21 | Resolved: 2022-11-18

## 2022-11-18 PROBLEM — O09.30 LATE PRENATAL CARE: Status: RESOLVED | Noted: 2022-08-12 | Resolved: 2022-11-18

## 2022-11-18 PROBLEM — O45.90 PLACENTAL ABRUPTION, DELIVERED: Status: RESOLVED | Noted: 2022-11-06 | Resolved: 2022-11-18

## 2022-11-18 PROBLEM — O99.810 ABNORMAL MATERNAL GLUCOSE TOLERANCE, ANTEPARTUM: Status: RESOLVED | Noted: 2022-09-23 | Resolved: 2022-11-18

## 2022-11-18 PROBLEM — O35.DXX0: Status: RESOLVED | Noted: 2022-08-24 | Resolved: 2022-11-18

## 2022-11-18 NOTE — ASSESSMENT & PLAN NOTE
2 wks PostPartum  Normal postpartum exam    The patient may advance activity as tolerated and may resume sexual activity at 6 wks PP  Contraception discussed  Patient plans: micronor bridge to salpingectomy  Already taking micronor, no issues taking at same time daily   EDPS score 3   Signs and symptoms of postpartum depression reviewed  Return in 4 weeks for postpartum exam   No pap on file this pregnancy or in [address] [place] [address] Loop - can send at next visit

## 2022-11-18 NOTE — ASSESSMENT & PLAN NOTE
Reviewed timing of coverage for MA-31   Signed 9/23/22 so should be valid from 10/23/22-3/23/23  She desires to schedule at next visit if possible

## 2022-11-18 NOTE — PROGRESS NOTES
Patient ID: [facility] is a 40 y o  female  Chief Complaint   Patient presents with   • Postpartum Care     PPD Scale-3;  ; no concerns         She presents for routine postpartum visit  She is now a [id number] who is 12 days postpartum s/p primary C/S on 22 - at 34+ weeks in setting of  labor and suspected abruption   Anesthesia: general, epidural inadequate   Complicated by postpartum hemorrhage QBL 1400+ cc requiring TXA, methergine       Postpartum course was uncomplicated otherwise  Received venofer for acute blood loss anemia  Discharged home on POD#3  Since d/c home she has had no complaints  She denies abn bleeding, pelvic pain, breast complaints, bowel/bladder dysfunction, depression/anx  Estelle Hernandez is thriving and is pumping     Baby possibly being discharged this    Minimal VB   Will f/u outpatient with [facility] regarding liver mass       [place]  Depression Scale Total: 3  Plans for contraception: micronor bridge to salpingectomy        The following portions of the patient's history were reviewed and updated as appropriate: allergies, current medications, past family history, past medical history, past social history, past surgical history, and problem list     Review of Systems   Constitutional: Negative for chills and fever  Eyes: Negative for visual disturbance  Respiratory: Negative for chest tightness and shortness of breath  Cardiovascular: Negative for chest pain  Gastrointestinal: Negative for abdominal pain, diarrhea, nausea and vomiting  Genitourinary: Negative for pelvic pain and vaginal bleeding  As noted in HPI   Skin: Negative for rash  Neurological: Negative for headaches  All other systems reviewed and are negative                 Objective:  /62 (BP Location: Right arm, Patient Position: Sitting, Cuff Size: Adult)   Pulse 69   Temp 98 6 °F (37 °C) (Tympanic)   Ht 4' 10" (1 473 m)   Wt 55 6 kg (122 lb 9 6 oz)   LMP  (LMP Unknown)   SpO2 97%   Breastfeeding Yes   BMI 25 62 kg/m²     Physical Exam  Constitutional:       General: She is not in acute distress  Appearance: Normal appearance  HENT:      Head: Normocephalic and atraumatic  Cardiovascular:      Rate and Rhythm: Normal rate  Pulmonary:      Effort: Pulmonary effort is normal  No respiratory distress  Abdominal:      General: A surgical scar is present  There is no distension  Palpations: Abdomen is soft  Tenderness: There is no abdominal tenderness  There is no guarding or rebound  Comments: Incision C/D/I    Neurological:      General: No focal deficit present  Mental Status: She is alert  Psychiatric:         Mood and Affect: Mood normal          Behavior: Behavior normal    Vitals and nursing note reviewed  Postpartum Depression: Low Risk    • Last EPDS Total Score: 3   • Last EPDS Self Harm Result: Never         Assessment/Plan:    Problem List Items Addressed This Visit        Other    Status post primary low transverse  section - Primary     2 wks PostPartum  Normal postpartum exam    The patient may advance activity as tolerated and may resume sexual activity at 6 wks PP  Contraception discussed  Patient plans: micronor bridge to salpingectomy  Already taking micronor, no issues taking at same time daily   EDPS score 3  Signs and symptoms of postpartum depression reviewed  Return in 4 weeks for postpartum exam   No pap on file this pregnancy or in [address] [place] [address] Loop - can send at next visit          Request for sterilization     Reviewed timing of coverage for MA-31   Signed 22 so should be valid from 10/23/22-3/23/23  She desires to schedule at next visit if possible

## 2022-12-30 ENCOUNTER — POSTPARTUM VISIT (OUTPATIENT)
Dept: OBGYN CLINIC | Facility: CLINIC | Age: 37
End: 2022-12-30

## 2022-12-30 VITALS
SYSTOLIC BLOOD PRESSURE: 100 MMHG | DIASTOLIC BLOOD PRESSURE: 62 MMHG | WEIGHT: 117 LBS | HEIGHT: 58 IN | BODY MASS INDEX: 24.56 KG/M2 | HEART RATE: 83 BPM

## 2022-12-30 DIAGNOSIS — Z30.2 REQUEST FOR STERILIZATION: ICD-10-CM

## 2022-12-30 DIAGNOSIS — Z98.891 STATUS POST PRIMARY LOW TRANSVERSE CESAREAN SECTION: ICD-10-CM

## 2022-12-30 DIAGNOSIS — Z12.4 CERVICAL CANCER SCREENING: ICD-10-CM

## 2022-12-30 PROBLEM — O99.280 HYPOTHYROIDISM AFFECTING PREGNANCY: Status: RESOLVED | Noted: 2022-08-12 | Resolved: 2022-12-30

## 2022-12-30 PROBLEM — O09.299 PREGNANCY WITH POOR OBSTETRIC HISTORY: Status: RESOLVED | Noted: 2022-08-12 | Resolved: 2022-12-30

## 2022-12-30 PROBLEM — O09.299 HISTORY OF PREGNANCY LOSS IN PRIOR PREGNANCY, CURRENTLY PREGNANT: Status: RESOLVED | Noted: 2022-08-24 | Resolved: 2022-12-30

## 2022-12-30 PROBLEM — O35.2XX0 PREVIOUS CHILD WITH CONGENITAL ANOMALY, CURRENTLY PREGNANT, ANTEPARTUM: Status: RESOLVED | Noted: 2022-08-16 | Resolved: 2022-12-30

## 2022-12-30 PROBLEM — O35.8XX0 PREGNANCY COMPLICATED BY FETAL ABDOMINAL ABNORMALITY: Status: RESOLVED | Noted: 2022-08-17 | Resolved: 2022-12-30

## 2022-12-30 PROBLEM — E03.9 HYPOTHYROIDISM AFFECTING PREGNANCY: Status: RESOLVED | Noted: 2022-08-12 | Resolved: 2022-12-30

## 2022-12-30 NOTE — PROGRESS NOTES
Subjective       Chief Complaint   Patient presents with   • Postpartum Care     PPD 3  22- csection  Becki Blas is a 40 y o  female who presents for a postpartum visit  She is 7 weeks postpartum following a LTCS  I have fully reviewed the prenatal and intrapartum course  The delivery was at 34 4 gestational weeks  Outcome: primary  section, low transverse incision  Anesthesia: epidural and general  Postpartum course has been normal  Baby's course has been normal  Baby is feeding by both breast and bottle - Neosure  Bleeding no bleeding  Bowel function is normal  Bladder function is normal  Patient is not sexually active  Contraception method is abstinence  Postpartum depression screening: negative  The following portions of the patient's history were reviewed and updated as appropriate: allergies, current medications, past family history, past medical history, past social history, past surgical history and problem list     Postpartum Depression: Low Risk    • Last EPDS Total Score: 3   • Last EPDS Self Harm Result: Never       Last PAP: none  GDM:  no      Review of Systems   Constitutional: Negative  HENT: Negative  Eyes: Negative  Respiratory: Negative  Cardiovascular: Negative  Gastrointestinal: Negative  Endocrine: Negative  Genitourinary:        As noted in HPI   Musculoskeletal: Negative  Skin: Negative  Allergic/Immunologic: Negative  Neurological: Negative  Hematological: Negative  Psychiatric/Behavioral: Negative            Objective     /62 (BP Location: Right arm, Patient Position: Sitting, Cuff Size: Adult)   Pulse 83   Ht 4' 10" (1 473 m)   Wt 53 1 kg (117 lb)   LMP  (LMP Unknown)   Breastfeeding Yes   BMI 24 45 kg/m²    General:  alert and oriented, in no acute distress   Abdomen: soft, non-tender; bowel sounds normal; no masses,  no organomegaly    Vulva:  normal   Vagina: normal vagina, no discharge, exudate, lesion, or erythema   Cervix:  no cervical motion tenderness   Corpus: normal size, contour, position, consistency, mobility, non-tender   Adnexa:  no mass, fullness, tenderness       Assessment/Plan     7 weeks postpartum exam      1  Contraception: none   Requests salpingectomy      Patient counseled on risks benefits and alternatives to permanent sterilization  She is counseled on long-acting reversible contraception as well as permanent sterilization for her male partner  She declines these alternatives  She was counseled on risks associated with laparoscopic sterilization including risk from general anesthesia, infection, blood loss, injury to bowel bladder, ureter or risk of laparotomy  She was also counseled on the increased risk of ectopic pregnancy as well as failure  She was counseled on laparoscopic bilateral salpingectomy to reduce her risk of ovarian cancer  MA 31 signed on 22  Informed consent sign    Surgery scheduled for 2/10/22    2  Pap performed  3   Follow up 2 weeks post op       Problem List Items Addressed This Visit        Other    Status post primary low transverse  section    Request for sterilization   Other Visit Diagnoses     Postpartum care and examination    -  Primary    Cervical cancer screening        Relevant Orders    Liquid-based pap, screening

## 2023-01-03 LAB
HPV HR 12 DNA CVX QL NAA+PROBE: NEGATIVE
HPV16 DNA CVX QL NAA+PROBE: NEGATIVE
HPV18 DNA CVX QL NAA+PROBE: NEGATIVE

## 2023-01-04 LAB
LAB AP GYN PRIMARY INTERPRETATION: NORMAL
Lab: NORMAL

## 2023-01-06 ENCOUNTER — OFFICE VISIT (OUTPATIENT)
Dept: ENDOCRINOLOGY | Facility: CLINIC | Age: 38
End: 2023-01-06

## 2023-01-06 VITALS
HEART RATE: 70 BPM | SYSTOLIC BLOOD PRESSURE: 110 MMHG | HEIGHT: 58 IN | WEIGHT: 118 LBS | BODY MASS INDEX: 24.77 KG/M2 | DIASTOLIC BLOOD PRESSURE: 80 MMHG

## 2023-01-06 DIAGNOSIS — E03.9 ACQUIRED HYPOTHYROIDISM: Primary | ICD-10-CM

## 2023-01-06 RX ORDER — ACETAMINOPHEN 325 MG/1
650 TABLET ORAL EVERY 6 HOURS PRN
COMMUNITY

## 2023-01-06 NOTE — ASSESSMENT & PLAN NOTE
She is now post partum and is taking levothyroxine 100 mcg daily  Will be due for repeat thyroid function testing and will decide on dosing once she completes labs  Continue current dose of levothyroxine for now

## 2023-01-06 NOTE — PROGRESS NOTES
Established Patient Progress Note      Chief Complaint   Patient presents with   • Hypothyroidism        History of Present Illness:   Kevin Smith is a 40 y o  female with hypothyroidism seen in follow up  She is currently taking levothyroxine 100 mcg daily  She reports taking this regularly and properly  She denies any symptoms of hypo/hyperthyroidism  On her initial pregnancy intake, TSH was found to be elevated at 46 with low freet4 of 0 48  Levothyroxine was started at that time at about 23 weeks pregnant  She recently had emergent  22 at 34 weeks pregnant to baby boy  She had hypothyroidism since the age of 8, initially on Synthroid until she came off her fathers insurance after high school and then switched to levothyroxine  She reports being off of levothyroxine previously when she lost insurance coverage  She reports being on levothyroxine for her second pregnancy but then put it on a back burner with other costs  Oldest son is17 years old and youngest is 8years old  Reports she did have pregnancy with twins prior to oldest son, 21 5 weeks with anomolies/deformities and had D&C  After youngest son, she also also had pregnancy another with other deformities, and another natural early term miscarriage in   Besides pregnancy with youngest son she has not been on levothyroxine for other pregnancies        Patient Active Problem List   Diagnosis   • Acquired hypothyroidism   • Status post primary low transverse  section   • Request for sterilization   • Potassium (K) deficiency   • Anemia   • Postpartum hemorrhage      Past Medical History:   Diagnosis Date   • Anemia    • Disease of thyroid gland    • Placental abruption, delivered 2022   • Potassium (K) deficiency    •  labor in third trimester 2022           Past Surgical History:   Procedure Laterality Date   • [place]  DELIVERY ONLY N/A 2022    Procedure:  SECTION (); Surgeon: Vahe Enrique MD;  Location: [facility];  Service: Obstetrics      Family History   Problem Relation Age of Onset   • Coronary artery disease Mother    • Diabetes Mother    • Heart attack Mother    • No Known Problems Father    • Diabetes Paternal Grandmother    • Diabetes Paternal Grandfather      Social History     Tobacco Use   • Smoking status: Every Day     Types: Cigarettes   • Smokeless tobacco: Never   • Tobacco comments:     one pack a week   Substance Use Topics   • Alcohol use: Not Currently     Allergies   Allergen Reactions   • Latex    • Morphine Irritability     Pt states she gets "angry and violent" when taking morphine  Current Outpatient Medications:   •  acetaminophen (TYLENOL) 325 mg tablet, Take 650 mg by mouth every 6 (six) hours as needed for mild pain, Disp: , Rfl:   •  levothyroxine (Levoxyl) 100 mcg tablet, Take 1 tablet (100 mcg total) by mouth daily, Disp: 30 tablet, Rfl: 3  •  other medication, see sig,, Medication/product name: prenatal&  supplement  Strength: N/A Sig (include dose, route, frequency): daily, Disp: , Rfl:   •  patient supplied medication, in the morning NusaPure- thyroid support, Disp: , Rfl:   •  patient supplied medication, Good Start Lactation Supplement, Disp: , Rfl:   •  docusate sodium (COLACE) 100 mg capsule, Take 1 capsule (100 mg total) by mouth 2 (two) times a day (Patient not taking: Reported on 2022), Disp: 30 capsule, Rfl: 0    Review of Systems   Constitutional: Negative for activity change, appetite change, chills, diaphoresis, fatigue, fever and unexpected weight change  HENT: Negative for sore throat, trouble swallowing and voice change  Respiratory: Negative for chest tightness and shortness of breath  Cardiovascular: Negative for chest pain, palpitations and leg swelling  Gastrointestinal: Negative for abdominal pain, constipation, diarrhea, nausea and vomiting     Endocrine: Negative for cold intolerance and heat intolerance  Neurological: Negative for dizziness, tremors, weakness and light-headedness  Psychiatric/Behavioral: Negative for sleep disturbance  All other systems reviewed and are negative  Physical Exam:  Body mass index is 24 66 kg/m²  /80   Pulse 70   Ht 4' 10" (1 473 m)   Wt 53 5 kg (118 lb)   BMI 24 66 kg/m²    Wt Readings from Last 3 Encounters:   01/06/23 53 5 kg (118 lb)   12/30/22 53 1 kg (117 lb)   11/18/22 55 6 kg (122 lb 9 6 oz)       Physical Exam  Vitals reviewed  Constitutional:       Appearance: Normal appearance  She is normal weight  HENT:      Head: Normocephalic  Cardiovascular:      Rate and Rhythm: Normal rate and regular rhythm  Pulses: Normal pulses  Heart sounds: Normal heart sounds  Pulmonary:      Effort: Pulmonary effort is normal       Breath sounds: Normal breath sounds  Neurological:      Mental Status: She is alert and oriented to person, place, and time  Psychiatric:         Mood and Affect: Mood normal          Behavior: Behavior normal          Thought Content: Thought content normal          Judgment: Judgment normal        Labs:   Lab Results   Component Value Date    CREATININE 0 63 11/06/2022    BUN 11 11/06/2022    K 3 8 11/06/2022     11/06/2022    CO2 24 11/06/2022     eGFR   Date Value Ref Range Status   11/06/2022 114 ml/min/1 73sq m Final     Lab Results   Component Value Date    ALT 14 11/06/2022    AST 11 11/06/2022    ALKPHOS 161 (H) 11/06/2022     Lab Results   Component Value Date    EZP5VSRFOKDY 3 830 10/07/2022    FUF8VUCIHYKI 7 330 (H) 09/16/2022    DDF1IOZIHHSZ 46 400 (H) 08/12/2022     Lab Results   Component Value Date    FREET4 0 88 10/07/2022       Impression & Plan:    Problem List Items Addressed This Visit        Endocrine    Acquired hypothyroidism - Primary     She is now post partum and is taking levothyroxine 100 mcg daily   Will be due for repeat thyroid function testing and will decide on dosing once she completes labs  Continue current dose of levothyroxine for now  Relevant Orders    TSH, 3rd generation    T4, free       Orders Placed This Encounter   Procedures   • TSH, 3rd generation     This is a patient instruction: This test is non-fasting  Please drink two glasses of water morning of bloodwork  Standing Status:   Future     Standing Expiration Date:   1/6/2024   • T4, free     Standing Status:   Future     Standing Expiration Date:   1/6/2024       There are no Patient Instructions on file for this visit  Discussed with the patient and all questioned fully answered  She will call me if any problems arise      VIC Chacko

## 2023-01-20 ENCOUNTER — APPOINTMENT (OUTPATIENT)
Dept: LAB | Facility: HOSPITAL | Age: 38
End: 2023-01-20

## 2023-01-20 DIAGNOSIS — E03.9 ACQUIRED HYPOTHYROIDISM: ICD-10-CM

## 2023-01-20 DIAGNOSIS — Z30.2 REQUEST FOR STERILIZATION: ICD-10-CM

## 2023-01-20 LAB
T4 FREE SERPL-MCNC: 0.7 NG/DL (ref 0.76–1.46)
TSH SERPL DL<=0.05 MIU/L-ACNC: 34.85 UIU/ML (ref 0.45–4.5)

## 2023-01-21 LAB
ABO GROUP BLD: NORMAL
BLD GP AB SCN SERPL QL: NEGATIVE
RH BLD: POSITIVE
SPECIMEN EXPIRATION DATE: NORMAL

## 2023-01-23 DIAGNOSIS — E03.9 ACQUIRED HYPOTHYROIDISM: Primary | ICD-10-CM

## 2023-01-23 RX ORDER — LEVOTHYROXINE SODIUM 112 UG/1
112 TABLET ORAL DAILY
Qty: 60 TABLET | Refills: 0 | Status: SHIPPED | OUTPATIENT
Start: 2023-01-23

## 2023-01-28 ENCOUNTER — APPOINTMENT (OUTPATIENT)
Dept: LAB | Facility: HOSPITAL | Age: 38
End: 2023-01-28

## 2023-01-28 DIAGNOSIS — Z30.2 REQUEST FOR STERILIZATION: ICD-10-CM

## 2023-01-28 LAB
ANION GAP SERPL CALCULATED.3IONS-SCNC: 6 MMOL/L (ref 4–13)
BUN SERPL-MCNC: 14 MG/DL (ref 5–25)
CALCIUM SERPL-MCNC: 9.2 MG/DL (ref 8.4–10.2)
CHLORIDE SERPL-SCNC: 104 MMOL/L (ref 96–108)
CO2 SERPL-SCNC: 29 MMOL/L (ref 21–32)
CREAT SERPL-MCNC: 0.82 MG/DL (ref 0.6–1.3)
ERYTHROCYTE [DISTWIDTH] IN BLOOD BY AUTOMATED COUNT: 17 % (ref 11.6–15.1)
GFR SERPL CREATININE-BSD FRML MDRD: 91 ML/MIN/1.73SQ M
GLUCOSE SERPL-MCNC: 88 MG/DL (ref 65–140)
HCT VFR BLD AUTO: 42.5 % (ref 34.8–46.1)
HGB BLD-MCNC: 13.2 G/DL (ref 11.5–15.4)
MCH RBC QN AUTO: 28.8 PG (ref 26.8–34.3)
MCHC RBC AUTO-ENTMCNC: 31.1 G/DL (ref 31.4–37.4)
MCV RBC AUTO: 93 FL (ref 82–98)
PLATELET # BLD AUTO: 561 THOUSANDS/UL (ref 149–390)
PMV BLD AUTO: 9.2 FL (ref 8.9–12.7)
POTASSIUM SERPL-SCNC: 4.3 MMOL/L (ref 3.5–5.3)
RBC # BLD AUTO: 4.59 MILLION/UL (ref 3.81–5.12)
SODIUM SERPL-SCNC: 139 MMOL/L (ref 135–147)
WBC # BLD AUTO: 11.48 THOUSAND/UL (ref 4.31–10.16)

## 2023-01-31 ENCOUNTER — ANESTHESIA EVENT (OUTPATIENT)
Dept: PERIOP | Facility: HOSPITAL | Age: 38
End: 2023-01-31

## 2023-02-01 ENCOUNTER — TELEPHONE (OUTPATIENT)
Dept: OBGYN CLINIC | Facility: MEDICAL CENTER | Age: 38
End: 2023-02-01

## 2023-02-03 NOTE — PRE-PROCEDURE INSTRUCTIONS
Pre-Surgery Instructions:   Medication Instructions   • acetaminophen (TYLENOL) 325 mg tablet Uses PRN- OK to take day of surgery   • levothyroxine 112 mcg tablet Take day of surgery  • other medication, see sig, Stop taking 7 days prior to surgery  INSTR ON FRANCIS CALL,  REPORT LOC , BRING PHOTO ID/MED LIST/INS  INFO ,SHOWER REV , STOP ASA/NSAID/VIT 7 DAY PREOP, PT VERBALIZES UNDERSTANDING W/ NO FURTHER QUESTIONS

## 2023-02-06 NOTE — H&P
H&P Exam - Gynecology   Kd Ferris 40 y o  female MRN: [id number]  Unit/Bed#:  Encounter: [id number]    Assessment/Plan     Assessment:  Request for sterilization    Plan:  Laparoscopic bilateral salpingectomy    Patient counseled on risks benefits and alternatives to permanent sterilization  She is counseled on long-acting reversible contraception as well as permanent sterilization for her male partner  She declines these alternatives  She was counseled on risks associated with laparoscopic sterilization including risk from general anesthesia, infection, blood loss, injury to bowel bladder, ureter or risk of laparotomy  She was also counseled on the increased risk of ectopic pregnancy as well as failure  She was counseled on laparoscopic bilateral salpingectomy to reduce her risk of ovarian cancer  Discussed with patient indication, risks, benefits and alternatives of surgical exploration  We discussed possibility of bleeding requiring blood transfusion, life-threatening infections requiring additional procedures, injuries to surrounding organs such as bladder, ureters, gastrointestinal tract and/or neurovascular structures  Additionally, we discussed other general risks associated to stress of surgery including but not limited to venous thromboembolism, acute myocardial events and stroke  Patient understands exam under anesthesia will be performed as part of this procedure and that my associates, including trainees may participate/perform exam and/or portions of the procedure as deemed safe and appropriate  All questions answered to patient's satisfaction  She agrees and wants to proceed  Informed consent form signed      History of Present Illness     HPI:  Kd Ferris is a 40 y o  female who presents with reuest for sterilization and declines all alternatives    Review of Systems   Constitutional: Negative  HENT: Negative  Eyes: Negative  Respiratory: Negative  Cardiovascular: Negative  Gastrointestinal: Negative  Endocrine: Negative  Genitourinary:        As noted in HPI   Musculoskeletal: Negative  Skin: Negative  Allergic/Immunologic: Negative  Neurological: Negative  Hematological: Negative  Psychiatric/Behavioral: Negative  Historical Information   Past Medical History:   Diagnosis Date   • Anemia    • Disease of thyroid gland    • Placental abruption, delivered 2022   • Potassium (K) deficiency    •  labor in third trimester 2022          Past Surgical History:   Procedure Laterality Date   • [place]  DELIVERY ONLY N/A 2022    Procedure:  SECTION (); Surgeon: Maria Antonia Morris MD;  Location: [facility];  Service: Obstetrics     OB/GYN History:     OB History    Para Term  AB Living   6 3 2 1 3 3   SAB IAB Ectopic Multiple Live Births   1 2 0 1 3      # Outcome Date GA Lbr Kayode/2nd Weight Sex Delivery Anes PTL Lv   6  22 34w4d  2030 g (4 lb 7 6 oz) M CS-LTranv EPI, Gen Y PRACHI      Complications: Fetal Intolerance, Abruptio Placenta      Name: Troy Smart ([address] [place] [address])      Apgar1: Aasa 43: 8   5 SAB 2020 6w0d    SAB      4 IAB 2013 16w0d             Birth Comments: Multiple anomalies- reports only a head developed  D&C   3 Term 12 38w0d   M Vag-Spont EPI  PRACHI      Complications: Nuchal cord affecting delivery   2 Term 08/09/10 40w0d   M Vag-Spont EPI  PRACHI   1A IAB  21w0d   M          Birth Comments: multiple anomalies, termination D&C   1B IAB  21w0d   F          Birth Comments: multiple anomalies  termination   D&C         Family History   Problem Relation Age of Onset   • Coronary artery disease Mother    • Diabetes Mother    • Heart attack Mother    • No Known Problems Father    • Diabetes Paternal Grandmother    • Diabetes Paternal Grandfather      Social History   Social History     Substance and Sexual Activity   Alcohol Use Not Currently Social History     Substance and Sexual Activity   Drug Use Not Currently     Social History     Tobacco Use   Smoking Status Former   • Types: Cigarettes   • Quit date:    • Years since quittin 0   Smokeless Tobacco Never   Tobacco Comments    one pack a week     E-Cigarette/Vaping   • E-Cigarette Use Current Some Day User      E-Cigarette/Vaping Substances   • Nicotine No    • THC No    • CBD No    • Flavoring Yes    • Other No    • Unknown No        Meds/Allergies   all current active meds have been reviewed  Allergies   Allergen Reactions   • Latex Rash     Very itchy   • Morphine Irritability     Pt states she gets "angry and violent" when taking morphine  Objective   Vitals: currently breastfeeding  No intake or output data in the 24 hours ending 23    Invasive Devices:    Invasive Devices     None                 Physical Exam

## 2023-02-10 ENCOUNTER — ANESTHESIA (OUTPATIENT)
Dept: PERIOP | Facility: HOSPITAL | Age: 38
End: 2023-02-10

## 2023-02-10 ENCOUNTER — HOSPITAL ENCOUNTER (OUTPATIENT)
Facility: HOSPITAL | Age: 38
Setting detail: OUTPATIENT SURGERY
Discharge: HOME | End: 2023-02-10
Attending: OBSTETRICS & GYNECOLOGY | Admitting: OBSTETRICS & GYNECOLOGY

## 2023-02-10 VITALS
WEIGHT: 121.69 LBS | BODY MASS INDEX: 25.54 KG/M2 | TEMPERATURE: 97.1 F | DIASTOLIC BLOOD PRESSURE: 66 MMHG | HEART RATE: 60 BPM | OXYGEN SATURATION: 95 % | RESPIRATION RATE: 14 BRPM | SYSTOLIC BLOOD PRESSURE: 121 MMHG | HEIGHT: 58 IN

## 2023-02-10 DIAGNOSIS — Z30.2 REQUEST FOR STERILIZATION: ICD-10-CM

## 2023-02-10 DIAGNOSIS — Z90.79 H/O BILATERAL SALPINGECTOMY: Primary | ICD-10-CM

## 2023-02-10 PROBLEM — Z78.9 EX-SMOKER FOR LESS THAN 1 YEAR: Status: ACTIVE | Noted: 2023-02-10

## 2023-02-10 LAB
ABO GROUP BLD: NORMAL
BLD GP AB SCN SERPL QL: NEGATIVE
EXT PREGNANCY TEST URINE: NEGATIVE
EXT. CONTROL: NORMAL
RH BLD: POSITIVE
SPECIMEN EXPIRATION DATE: NORMAL

## 2023-02-10 RX ORDER — OXYCODONE HYDROCHLORIDE 5 MG/1
5 TABLET ORAL EVERY 4 HOURS PRN
Qty: 15 TABLET | Refills: 0 | Status: SHIPPED | OUTPATIENT
Start: 2023-02-10 | End: 2023-02-20

## 2023-02-10 RX ORDER — MEPERIDINE HYDROCHLORIDE 25 MG/ML
12.5 INJECTION INTRAMUSCULAR; INTRAVENOUS; SUBCUTANEOUS
Status: DISCONTINUED | OUTPATIENT
Start: 2023-02-10 | End: 2023-02-10 | Stop reason: HOSPADM

## 2023-02-10 RX ORDER — MAGNESIUM HYDROXIDE 1200 MG/15ML
LIQUID ORAL AS NEEDED
Status: DISCONTINUED | OUTPATIENT
Start: 2023-02-10 | End: 2023-02-10 | Stop reason: HOSPADM

## 2023-02-10 RX ORDER — ACETAMINOPHEN 325 MG/1
975 TABLET ORAL EVERY 6 HOURS PRN
Status: DISCONTINUED | OUTPATIENT
Start: 2023-02-10 | End: 2023-02-10 | Stop reason: HOSPADM

## 2023-02-10 RX ORDER — MIDAZOLAM HYDROCHLORIDE 2 MG/2ML
INJECTION, SOLUTION INTRAMUSCULAR; INTRAVENOUS AS NEEDED
Status: DISCONTINUED | OUTPATIENT
Start: 2023-02-10 | End: 2023-02-10

## 2023-02-10 RX ORDER — ROCURONIUM BROMIDE 10 MG/ML
INJECTION, SOLUTION INTRAVENOUS AS NEEDED
Status: DISCONTINUED | OUTPATIENT
Start: 2023-02-10 | End: 2023-02-10

## 2023-02-10 RX ORDER — PROPOFOL 10 MG/ML
INJECTION, EMULSION INTRAVENOUS AS NEEDED
Status: DISCONTINUED | OUTPATIENT
Start: 2023-02-10 | End: 2023-02-10

## 2023-02-10 RX ORDER — SODIUM CHLORIDE, SODIUM LACTATE, POTASSIUM CHLORIDE, CALCIUM CHLORIDE 600; 310; 30; 20 MG/100ML; MG/100ML; MG/100ML; MG/100ML
125 INJECTION, SOLUTION INTRAVENOUS CONTINUOUS
Status: DISCONTINUED | OUTPATIENT
Start: 2023-02-10 | End: 2023-02-10

## 2023-02-10 RX ORDER — IBUPROFEN 600 MG/1
600 TABLET ORAL EVERY 6 HOURS PRN
Qty: 30 TABLET | Refills: 0 | Status: SHIPPED | OUTPATIENT
Start: 2023-02-10

## 2023-02-10 RX ORDER — BUPIVACAINE HYDROCHLORIDE 5 MG/ML
INJECTION, SOLUTION PERINEURAL AS NEEDED
Status: DISCONTINUED | OUTPATIENT
Start: 2023-02-10 | End: 2023-02-10 | Stop reason: HOSPADM

## 2023-02-10 RX ORDER — FENTANYL CITRATE/PF 50 MCG/ML
25 SYRINGE (ML) INJECTION
Status: DISCONTINUED | OUTPATIENT
Start: 2023-02-10 | End: 2023-02-10 | Stop reason: HOSPADM

## 2023-02-10 RX ORDER — HYDROMORPHONE HCL/PF 1 MG/ML
0.5 SYRINGE (ML) INJECTION
Status: DISCONTINUED | OUTPATIENT
Start: 2023-02-10 | End: 2023-02-10 | Stop reason: HOSPADM

## 2023-02-10 RX ORDER — ONDANSETRON 2 MG/ML
4 INJECTION INTRAMUSCULAR; INTRAVENOUS EVERY 6 HOURS PRN
Status: DISCONTINUED | OUTPATIENT
Start: 2023-02-10 | End: 2023-02-10 | Stop reason: HOSPADM

## 2023-02-10 RX ORDER — KETOROLAC TROMETHAMINE 30 MG/ML
INJECTION, SOLUTION INTRAMUSCULAR; INTRAVENOUS AS NEEDED
Status: DISCONTINUED | OUTPATIENT
Start: 2023-02-10 | End: 2023-02-10

## 2023-02-10 RX ORDER — LIDOCAINE HYDROCHLORIDE 20 MG/ML
INJECTION, SOLUTION EPIDURAL; INFILTRATION; INTRACAUDAL; PERINEURAL AS NEEDED
Status: DISCONTINUED | OUTPATIENT
Start: 2023-02-10 | End: 2023-02-10

## 2023-02-10 RX ORDER — IBUPROFEN 600 MG/1
600 TABLET ORAL EVERY 6 HOURS PRN
Status: DISCONTINUED | OUTPATIENT
Start: 2023-02-10 | End: 2023-02-10 | Stop reason: HOSPADM

## 2023-02-10 RX ORDER — ONDANSETRON 2 MG/ML
INJECTION INTRAMUSCULAR; INTRAVENOUS AS NEEDED
Status: DISCONTINUED | OUTPATIENT
Start: 2023-02-10 | End: 2023-02-10

## 2023-02-10 RX ORDER — OXYCODONE HYDROCHLORIDE 5 MG/1
5 TABLET ORAL EVERY 4 HOURS PRN
Status: DISCONTINUED | OUTPATIENT
Start: 2023-02-10 | End: 2023-02-10 | Stop reason: HOSPADM

## 2023-02-10 RX ORDER — DEXAMETHASONE SODIUM PHOSPHATE 10 MG/ML
INJECTION, SOLUTION INTRAMUSCULAR; INTRAVENOUS AS NEEDED
Status: DISCONTINUED | OUTPATIENT
Start: 2023-02-10 | End: 2023-02-10

## 2023-02-10 RX ORDER — ONDANSETRON 2 MG/ML
4 INJECTION INTRAMUSCULAR; INTRAVENOUS ONCE AS NEEDED
Status: DISCONTINUED | OUTPATIENT
Start: 2023-02-10 | End: 2023-02-10 | Stop reason: HOSPADM

## 2023-02-10 RX ORDER — FENTANYL CITRATE 50 UG/ML
INJECTION, SOLUTION INTRAMUSCULAR; INTRAVENOUS AS NEEDED
Status: DISCONTINUED | OUTPATIENT
Start: 2023-02-10 | End: 2023-02-10

## 2023-02-10 RX ORDER — EPHEDRINE SULFATE 50 MG/ML
INJECTION INTRAVENOUS AS NEEDED
Status: DISCONTINUED | OUTPATIENT
Start: 2023-02-10 | End: 2023-02-10

## 2023-02-10 RX ADMIN — FENTANYL CITRATE 50 MCG: 50 INJECTION INTRAMUSCULAR; INTRAVENOUS at 14:46

## 2023-02-10 RX ADMIN — MIDAZOLAM 2 MG: 1 INJECTION INTRAMUSCULAR; INTRAVENOUS at 14:24

## 2023-02-10 RX ADMIN — LIDOCAINE HYDROCHLORIDE 100 MG: 20 INJECTION, SOLUTION EPIDURAL; INFILTRATION; INTRACAUDAL; PERINEURAL at 14:28

## 2023-02-10 RX ADMIN — KETOROLAC TROMETHAMINE 30 MG: 30 INJECTION, SOLUTION INTRAMUSCULAR at 15:06

## 2023-02-10 RX ADMIN — FENTANYL CITRATE 50 MCG: 50 INJECTION INTRAMUSCULAR; INTRAVENOUS at 14:28

## 2023-02-10 RX ADMIN — ONDANSETRON 4 MG: 2 INJECTION INTRAMUSCULAR; INTRAVENOUS at 14:32

## 2023-02-10 RX ADMIN — SUGAMMADEX 250 MG: 100 INJECTION, SOLUTION INTRAVENOUS at 15:14

## 2023-02-10 RX ADMIN — DEXAMETHASONE SODIUM PHOSPHATE 5 MG: 10 INJECTION INTRAMUSCULAR; INTRAVENOUS at 14:32

## 2023-02-10 RX ADMIN — IBUPROFEN 600 MG: 600 TABLET, FILM COATED ORAL at 17:02

## 2023-02-10 RX ADMIN — PROPOFOL 200 MG: 10 INJECTION, EMULSION INTRAVENOUS at 14:28

## 2023-02-10 RX ADMIN — EPHEDRINE SULFATE 5 MG: 50 INJECTION, SOLUTION INTRAVENOUS at 15:09

## 2023-02-10 RX ADMIN — SODIUM CHLORIDE, SODIUM LACTATE, POTASSIUM CHLORIDE, AND CALCIUM CHLORIDE 125 ML/HR: .6; .31; .03; .02 INJECTION, SOLUTION INTRAVENOUS at 13:21

## 2023-02-10 RX ADMIN — ROCURONIUM BROMIDE 40 MG: 10 INJECTION, SOLUTION INTRAVENOUS at 14:28

## 2023-02-10 NOTE — ANESTHESIA POSTPROCEDURE EVALUATION
Post-Op Assessment Note    CV Status:  Stable  Pain Score: 1    Pain management: adequate     Mental Status:  Alert and awake   Hydration Status:  Euvolemic   PONV Controlled:  Controlled   Airway Patency:  Patent  Airway: intubated   Two or more mitigation strategies used for obstructive sleep apnea   Post Op Vitals Reviewed: Yes      Staff: Anesthesiologist, CRNA         No notable events documented      BP      Temp     Pulse     Resp      SpO2      /65   Pulse 56   Temp (!) 96 8 °F (36 °C) (Temporal)   Resp 16   Ht 4' 10" (1 473 m)   Wt 55 2 kg (121 lb 11 1 oz)   LMP 01/17/2023 (Exact Date)   SpO2 98%   Breastfeeding Yes   BMI 25 43 kg/m²

## 2023-02-10 NOTE — ANESTHESIA PREPROCEDURE EVALUATION
Procedure:  SALPINGECTOMY, LAP (Bilateral: Uterus)    Relevant Problems   ENDO   (+) Acquired hypothyroidism      HEMATOLOGY   (+) Anemia      Other   (+) Ex-smoker for less than 1 year   (+) Request for sterilization        Physical Exam    Airway    Mallampati score: II  TM Distance: >3 FB  Neck ROM: full     Dental       Cardiovascular  Rhythm: regular, Rate: normal, Cardiovascular exam normal    Pulmonary  Pulmonary exam normal Breath sounds clear to auscultation,     Other Findings        Anesthesia Plan  ASA Score- 2     Anesthesia Type- general with ASA Monitors  Additional Monitors:   Airway Plan: ETT  Plan Factors-    Chart reviewed  Existing labs reviewed  Patient summary reviewed  Patient is not a current smoker  Patient not instructed to abstain from smoking on day of procedure  Patient did not smoke on day of surgery  There is medical exclusion for perioperative obstructive sleep apnea risk education  Induction- intravenous  Postoperative Plan- Plan for postoperative opioid use  Planned trial extubation    Informed Consent- Anesthetic plan and risks discussed with patient

## 2023-02-10 NOTE — OP NOTE
OPERATIVE REPORT  PATIENT NAME: Swetha Gee    :  1985  MRN: [id number]  Pt Location: [facility] ROOM 02    SURGERY DATE: 2/10/2023    Surgeon(s) and Role:     * Amanda Jang MD - Primary     * Anastasiya Franklin MD - Assisting    Preop Diagnosis:  Request for sterilization [Z30 2]    Post-Op Diagnosis Codes:     * Request for sterilization [Z30 2]    Procedure(s):  Bilateral - SALPINGECTOMY  LAP    Specimen(s):  ID Type Source Tests Collected by Time Destination   1 :  Tissue Fallopian Tubes, Bilateral TISSUE EXAM Amanda Jang MD 2/10/2023 1447        Estimated Blood Loss:   5 cc    Drains:  none    Anesthesia Type:   General    Operative Indications:  Request for sterilization [Z30 2]      Operative Findings:  Normal appearing external female genitalia  Normal appearing vaginal mucosa  Large, grossly normal appearing cervix  Small, anteverted, mobile uterus  No palpable adnexal masses or fullness    On laparoscopic evaluation:   Normal abdominal survey without evidence of injury or gross pathology   Grossly normal pelvic survey with the exception of mildly enlarged left ovary, adherent to left abdominal wall, ipsilateral tube enlarged with evidence of hydrosalpinx, normal uterus  Grossly normal appearing right fallopian tube and ovary  Moderate adhesion of omentum to sigrid-lateral left abdominal wall  Ureteral vermiculation appreciated bilaterally  Complications:   None    Procedure and Technique:  Brief History    All risks, benefits, and alternatives to the procedure were discussed with the patient and she had the opportunity to ask questions  The risk of regret of procedure was also addressed with the patient and options for LARC was discussed  Patient expressed desire to continue with tubal sterilization  Informed consent was obtained       Description of Procedure    Patient was taken to the operating room were a time out was performed to confirm correct patient and correct procedure  General endotracheal anesthesia (GET) was administered and the patient was positioned on the OR table in the dorsal lithotomy position  All pressure points were padded and a brent hugger was placed to maintain control of core body temperature  A bimanual exam was performed and the uterus was noted to be midposition, normal in size and consistency with no palpable adnexal masses or fullness  The patient was prepped and draped in the usual sterile fashion with chloroprep on the abdomen and clorexidine prep on the vagina and perineum  Operative Technique    A straight catheter was introduced into the bladder, which was drained of 300 cc of clear yellow urine  A bivalved speculum was inserted into the vagina and used to visualize the anterior lip of the cervix, which was then grasped with a single toothed tenaculum  A cone uterine manipulator was inserted into the cervix and secured to the tenaculum  The speculum was removed from the vagina  Sterile gloves were then exchanged and attention was turned to the abdomen  A 5mm incision was made at the inferior edge of the umbilicus for introduction of a 5 mm trocar  Trocar was introduced under direct visualization  Pneumoperitoneum was then established to a maximum of 15mmHg  The entire abdomen and pelvis was inspected and there was no evidence of injury to bowel, bladder, vasculature, or other structures  Attention was then turned to the pelvis  Patient was placed in Trendelenburg and the uterus was elevated to visualize the fallopian tubes  There was noted to be grossly normal tubes and ovaries bilaterally  A second port site was selected 2cm cephalad to the pubic symphysis and 3 cm medially  A 0 5 cm incision was made for introduction of a 0 5 cm trocar under direct visualization  Subsequently a third port side was selected in the contralateral side, a 2cm cephalad to the pubic symphysis and 3 cm medially   A 0 5 cm incision was made for introduction of a 0 5 cm trocar under direct visualization A grasper  was inserted through the second port port and used to visualize the fimbriated ends of the tubes  Adhesion lysis was performed with Enseal device  The ometum was dissected form the anterior uterine wall  The left fallopian tube was identified and followed to its fimbriated end  The mesosalpinx was grasped with Enseal forceps and cauterized with bipolar electrocautery and cut  This was done starting from the fimbria and working proximally until the cornua  The left fallopian tube was transected approximately 1 cm from the uterine cornua and removed  Following removal of the left fallopian tube, the same technique was used to remove the right tube    Following  tubal resection, pneumoperitoneum was allowed to escape  Adequate hemostasis was visualized  The inferior trocar was removed under direct visualization  The laparoscope was withdrawn from the abdomen, followed by its trocar sleeve at the umbilicus  Skin incisions were closed with running absorbable suture of 4-0 monocryl  Attention was turned to the vagina  Bivalved speculum was reinserted into the vagina and the uterine manipulator was withdrawn  Single toothed tenaculum was removed from the anterior lip of the cervix  Good hemostasis was confirmed at the tenaculum puncture sites  Speculum was then removed from the vagina  At the conclusion of the procedure, all needle, sponge, and instrument counts were noted to be correct x2  Patient tolerated the procedure well and was transferred to PACU in stable condition prior to discharge with follow up in 1-2 weeks  Dr Hari Mayfield was present and participated in all key portions of the case         I was present for the entire procedure    Patient Disposition:  PACU         SIGNATURE: Oscar Martínez MD  DATE: February 10, 2023  TIME: 3:23 PM

## 2023-02-10 NOTE — INTERVAL H&P NOTE
H&P reviewed  After examining the patient I find no changes in the patients condition since the H&P had been written      Vitals:    02/10/23 1325   BP: 123/65   Pulse: (!) 51   Resp: 16   Temp: 98 2 °F (36 8 °C)   SpO2: 98%

## 2023-03-17 ENCOUNTER — OFFICE VISIT (OUTPATIENT)
Dept: OBGYN CLINIC | Facility: CLINIC | Age: 38
End: 2023-03-17

## 2023-03-17 VITALS
DIASTOLIC BLOOD PRESSURE: 64 MMHG | HEART RATE: 84 BPM | HEIGHT: 58 IN | SYSTOLIC BLOOD PRESSURE: 118 MMHG | TEMPERATURE: 96.9 F | BODY MASS INDEX: 25.65 KG/M2 | WEIGHT: 122.2 LBS

## 2023-03-17 DIAGNOSIS — Z90.79 H/O BILATERAL SALPINGECTOMY: Primary | ICD-10-CM

## 2023-03-17 DIAGNOSIS — Z09 POSTOPERATIVE FOLLOW-UP: ICD-10-CM

## 2023-03-17 NOTE — PROGRESS NOTES
Assessment/Plan:    Problem List Items Addressed This Visit        Other    H/O bilateral salpingectomy - Primary   Other Visit Diagnoses     Postoperative follow-up              Patient doing well status post salpingectomy  Discussed with patient pathology results in detail  Follow-up in December for routine annual GYN exam     Subjective   Patient ID: Yvette Keenan is a 40 y o  female  Patient is here for a follow-up  Chief Complaint   Patient presents with   • Post-op     Pt reports no concerns  Patient is here status post bilateral salpingectomy 4 weeks ago  She reports no abdominal pain  She reports no abnormal vaginal discharge or odor  She is having normal regular bowel movements and is voiding normally  Menstrual History:  OB History        6    Para   3    Term   2       1    AB   3    Living   3       SAB   1    IAB   2    Ectopic   0    Multiple   1    Live Births   3                         Past Medical History:   Diagnosis Date   • Anemia    • Disease of thyroid gland    • Placental abruption, delivered 2022   • Potassium (K) deficiency    •  labor in third trimester 2022            Past Surgical History:   Procedure Laterality Date   • [place]  DELIVERY ONLY N/A 2022    Procedure:  SECTION (); Surgeon: Manju Aguilar MD;  Location: [facility] ;  Service: Obstetrics   • [place] LAPAROSCOPY W/RMVL ADNEXAL STRUCTURES Bilateral 2/10/2023    Procedure: SALPINGECTOMY, LAP;  Surgeon: Natan Guevara MD;  Location: [facility];  Service: Gynecology       Social History     Tobacco Use   • Smoking status: Former     Types: Cigarettes     Quit date:      Years since quittin 2   • Smokeless tobacco: Never   • Tobacco comments:     one pack a week   Vaping Use   • Vaping Use: Some days   • Substances: Flavoring   Substance Use Topics   • Alcohol use: Not Currently   • Drug use: Not Currently       Allergies   Allergen Reactions • Latex Rash     Very itchy   • Morphine Irritability     Pt states she gets "angry and violent" when taking morphine  Current Outpatient Medications:   •  acetaminophen (TYLENOL) 325 mg tablet, Take 650 mg by mouth every 6 (six) hours as needed for mild pain, Disp: , Rfl:   •  ibuprofen (MOTRIN) 600 mg tablet, Take 1 tablet (600 mg total) by mouth every 6 (six) hours as needed for mild pain, Disp: 30 tablet, Rfl: 0  •  levothyroxine 112 mcg tablet, Take 1 tablet (112 mcg total) by mouth daily, Disp: 60 tablet, Rfl: 0  •  other medication, see sig,, Medication/product name: prenatal&  supplement  Strength: N/A Sig (include dose, route, frequency): daily, Disp: , Rfl:       Review of Systems   Constitutional: Negative  HENT: Negative  Eyes: Negative  Respiratory: Negative  Cardiovascular: Negative  Gastrointestinal: Negative  Endocrine: Negative  Genitourinary:        As noted in HPI   Musculoskeletal: Negative  Skin: Negative  Allergic/Immunologic: Negative  Neurological: Negative  Hematological: Negative  Psychiatric/Behavioral: Negative  /64 (BP Location: Right arm, Patient Position: Sitting, Cuff Size: Adult)   Pulse 84   Temp (!) 96 9 °F (36 1 °C) (Tympanic)   Ht 4' 10" (1 473 m)   Wt 55 4 kg (122 lb 3 2 oz)   BMI 25 54 kg/m²       Physical Exam  Constitutional:       General: She is not in acute distress  Appearance: She is well-developed  Abdominal:      General: A surgical scar is present  Palpations: Abdomen is soft  Tenderness: There is no abdominal tenderness  There is no guarding  Neurological:      Mental Status: She is alert and oriented to person, place, and time  Skin:     General: Skin is warm and dry     Psychiatric:         Behavior: Behavior normal        Incisions clean dry and intact    Tissue Exam: [id number]  Order: [id number]   Collected 2/10/2023  2:47 PM      Status: Final result      Visible to patient: Yes (seen)      Dx: Request for sterilization      0 Result Notes  Component    Case Report   Surgical Pathology Report                         Case: [id number]                                    Authorizing Provider: José Duncan MD           Collected:           02/10/2023 1447               Ordering Location:     [facility]        Received:            02/10/2023 1541                                      [facility]                                                      Pathologist:           Kareem Eller MD                                                          Specimen:    Fallopian Tubes, Bilateral                                                                 Final Diagnosis   A  Fallopian tubes, bilateral:     - Segments of two (2) separate and unremarkable fallopian tubes; complete cross sections of each identified       Electronically signed by Kareem Eller MD on 2/14/2023 at  9:20 AM   Additional Information    All reported additional testing was performed with appropriately reactive controls   These tests were developed and their performance characteristics determined by [facility] or appropriate performing facility, though some tests may be performed on tissues which have not been validated for performance characteristics (such as staining performed on alcohol exposed cell blocks and decalcified tissues)   Results should be interpreted with caution and in the context of the patients’ clinical condition  These tests may not be cleared or approved by the [place]  Food and Drug Administration, though the FDA has determined that such clearance or approval is not necessary  These tests are used for clinical purposes and they should not be regarded as investigational or for research  This laboratory has been approved by [place] 88, designated as a high-complexity laboratory and is qualified to perform these tests       - Interpretation performed at [facility], [address] [facility] [address] [facility], [address] [facility] [place]   Gross Description    A  The specimen is received in formalin, labeled with the patient's name and hospital number, and is designated " fallopian tubes, bilateral"  It consists of 2 gray-purple, glistening, fimbriated, tortuous segments of fallopian tube measuring 3 5 and 6 5 cm in length and ranging from 0 5 to 1 0 cm in diameter  The smaller tube displays adhesions and is multifocally rough and ragged  Also in the specimen container are 3 additional fragments of tan-white, focally ragged tissue consistent with remaining fallopian tube shaft from the shorter tube, ranging from 0 8 x 0 6 x 0 5 cm to 2 7 x 0 6 x 0 4 cm  Sectioning the fimbria of the shorter tube displays a focally slightly dense and pale cut surface  The remaining cut surfaces are unremarkable  Representative sections are submitted as follows:     A1: Entire fimbria of shorter tube  A2: [place] tube cross-sections, includes one section from each additional, separate tissue  A3: Longer fallopian tube     Note: The estimated total formalin fixation time based upon information provided by the submitting clinician and the standard processing schedule is under 72 hours      [facility]   Resulting Agency [facility]              Specimen Collected: 02/10/23  2:47 PM Last Resulted: 02/14/23  9:20 AM        Order Details      View Encounter      Lab and Collection Details      Routing      Result History     View Encounter Conversation           Scans on Order [id number]              The remainder of her physical examination was deferred as she was here today for consultation and discussion  I have spent ** minutes on day of encounter, time spent involved pre-charting, review of previous records, face to face encounter, counseling and post visit documentation      Future Appointments   Date Time Provider Damaso Rothman   6/9/2023 10:00 AM [facility]   12/29/2023 9:30 AM Chetan Austin MD [facility]

## 2023-03-22 DIAGNOSIS — E03.9 ACQUIRED HYPOTHYROIDISM: ICD-10-CM

## 2023-03-22 RX ORDER — LEVOTHYROXINE SODIUM 112 UG/1
TABLET ORAL
Qty: 30 TABLET | Refills: 2 | Status: SHIPPED | OUTPATIENT
Start: 2023-03-22

## 2023-05-12 ENCOUNTER — LAB (OUTPATIENT)
Dept: LAB | Facility: CLINIC | Age: 38
End: 2023-05-12

## 2023-05-12 DIAGNOSIS — E03.9 HYPOTHYROIDISM AFFECTING PREGNANCY IN SECOND TRIMESTER: ICD-10-CM

## 2023-05-12 DIAGNOSIS — E03.9 ACQUIRED HYPOTHYROIDISM: Primary | ICD-10-CM

## 2023-05-12 DIAGNOSIS — O99.282 HYPOTHYROIDISM AFFECTING PREGNANCY IN SECOND TRIMESTER: ICD-10-CM

## 2023-05-12 DIAGNOSIS — E03.9 ACQUIRED HYPOTHYROIDISM: ICD-10-CM

## 2023-05-12 LAB
T4 FREE SERPL-MCNC: 0.64 NG/DL (ref 0.76–1.46)
TSH SERPL DL<=0.05 MIU/L-ACNC: 205 UIU/ML (ref 0.45–4.5)

## 2023-05-12 RX ORDER — LEVOTHYROXINE SODIUM 137 UG/1
137 TABLET ORAL DAILY
Qty: 90 TABLET | Refills: 2 | Status: SHIPPED | OUTPATIENT
Start: 2023-05-12 | End: 2023-05-16 | Stop reason: ALTCHOICE

## 2023-05-15 ENCOUNTER — TELEPHONE (OUTPATIENT)
Dept: ENDOCRINOLOGY | Facility: CLINIC | Age: 38
End: 2023-05-15

## 2023-05-15 NOTE — TELEPHONE ENCOUNTER
Pt called she states she was notified from our office with lab results on 5/12/23 and a changed of thyroid med  She was on levothyroxine and it was changed to euthyrox  Pt states the pharmacy can not get the rx, [facility] and none [facility] them can get them  She wants to go back to her levothyroxine  Please advise  Pt also is switching [facility] in [address]

## 2023-05-16 DIAGNOSIS — E03.9 ACQUIRED HYPOTHYROIDISM: ICD-10-CM

## 2023-05-16 DIAGNOSIS — E03.9 ACQUIRED HYPOTHYROIDISM: Primary | ICD-10-CM

## 2023-05-16 RX ORDER — LEVOTHYROXINE SODIUM 137 UG/1
137 TABLET ORAL DAILY
Qty: 90 TABLET | Refills: 2 | Status: SHIPPED | OUTPATIENT
Start: 2023-05-16

## 2023-05-16 RX ORDER — LEVOTHYROXINE SODIUM 137 UG/1
137 TABLET ORAL DAILY
Qty: 90 TABLET | Refills: 2 | Status: SHIPPED | OUTPATIENT
Start: 2023-05-16 | End: 2023-05-16 | Stop reason: SDUPTHER

## 2023-05-16 NOTE — TELEPHONE ENCOUNTER
Ok I changed it back to the generic  If her thyroid levels continue to be abnormal despite medication compliance, then I will try switching her to a different brand

## 2023-05-17 ENCOUNTER — TELEPHONE (OUTPATIENT)
Dept: ENDOCRINOLOGY | Facility: CLINIC | Age: 38
End: 2023-05-17

## 2023-05-18 ENCOUNTER — APPOINTMENT (OUTPATIENT)
Dept: LAB | Facility: CLINIC | Age: 38
End: 2023-05-18

## 2023-05-18 DIAGNOSIS — E03.9 ACQUIRED HYPOTHYROIDISM: ICD-10-CM

## 2023-05-19 ENCOUNTER — OFFICE VISIT (OUTPATIENT)
Dept: FAMILY MEDICINE CLINIC | Facility: CLINIC | Age: 38
End: 2023-05-19

## 2023-05-19 VITALS
HEIGHT: 58 IN | WEIGHT: 124 LBS | BODY MASS INDEX: 26.03 KG/M2 | SYSTOLIC BLOOD PRESSURE: 120 MMHG | DIASTOLIC BLOOD PRESSURE: 61 MMHG | TEMPERATURE: 98.2 F | HEART RATE: 83 BPM | OXYGEN SATURATION: 98 %

## 2023-05-19 DIAGNOSIS — Z00.00 WELL ADULT EXAM: Primary | ICD-10-CM

## 2023-05-19 PROBLEM — Z98.891 STATUS POST PRIMARY LOW TRANSVERSE CESAREAN SECTION: Status: RESOLVED | Noted: 2022-08-31 | Resolved: 2023-05-19

## 2023-05-19 LAB
THYROGLOB AB SERPL-ACNC: 1.1 IU/ML (ref 0–0.9)
THYROPEROXIDASE AB SERPL-ACNC: 368 IU/ML (ref 0–34)

## 2023-05-22 ENCOUNTER — TELEMEDICINE (OUTPATIENT)
Dept: FAMILY MEDICINE CLINIC | Facility: CLINIC | Age: 38
End: 2023-05-22

## 2023-05-22 DIAGNOSIS — J01.00 ACUTE NON-RECURRENT MAXILLARY SINUSITIS: Primary | ICD-10-CM

## 2023-05-22 RX ORDER — AZITHROMYCIN 250 MG/1
TABLET, FILM COATED ORAL
Qty: 6 TABLET | Refills: 0 | Status: SHIPPED | OUTPATIENT
Start: 2023-05-22 | End: 2023-05-29

## 2023-05-22 RX ORDER — IPRATROPIUM BROMIDE 21 UG/1
2 SPRAY, METERED NASAL EVERY 12 HOURS
Qty: 30 ML | Refills: 0 | Status: SHIPPED | OUTPATIENT
Start: 2023-05-22

## 2023-05-22 NOTE — PROGRESS NOTES
Virtual Regular Visit    Verification of patient location:    Patient is located at Home in the following state in which I hold an active license [place]      Assessment/Plan: Side effect profile of medication reviewed  Recommend return to office for recheck if no improvement or worsening symptoms  Problem List Items Addressed This Visit    None  Visit Diagnoses     Acute non-recurrent maxillary sinusitis    -  Primary    Relevant Medications    azithromycin (ZITHROMAX) 250 mg tablet    ipratropium (ATROVENT) 0 03 % nasal spray               Reason for visit is   Chief Complaint   Patient presents with   • Virtual Regular Visit        Encounter provider Christine Francis DO    Provider located at [address] [place] [address] [address] [id number]      Recent Visits  Date Type Provider Dept   05/19/23 Office Visit Christine Francis DO [facility] [address] recent visits within past 7 days and meeting all other requirements  Today's Visits  Date Type Provider Dept   05/22/23 Telemedicine Christine Francis DO [facility]   Showing today's visits and meeting all other requirements  Future Appointments  No visits were found meeting these conditions  Showing future appointments within next 150 days and meeting all other requirements       The patient was identified by name and date of birth  Kbdillon Pillai was informed that this is a telemedicine visit and that the visit is being conducted through the [address] [place] [address] Now platform  She agrees to proceed     My office door was closed  No one else was in the room  She acknowledged consent and understanding of privacy and security of the video platform  The patient has agreed to participate and understands they can discontinue the visit at any time  Patient is aware this is a billable service  Subjective  Precious Pillai is a 40 y o  female for sinus pressure and congestion        HPI     Past Medical "History:   Diagnosis Date   • Anemia    • Disease of thyroid gland    • Placental abruption, delivered 2022   • Potassium (K) deficiency    •  labor in third trimester 2022            Past Surgical History:   Procedure Laterality Date   • [place]  DELIVERY ONLY N/A 2022    Procedure:  SECTION (); Surgeon: Ayleen Nash MD;  Location: [facility];  Service: Obstetrics   • [place] LAPAROSCOPY W/RMVL ADNEXAL STRUCTURES Bilateral 2/10/2023    Procedure: SALPINGECTOMY, LAP;  Surgeon: Isis Lua MD;  Location: [facility] OR;  Service: Gynecology       Current Outpatient Medications   Medication Sig Dispense Refill   • azithromycin (ZITHROMAX) 250 mg tablet Take 2 tablets today then 1 tablet daily x 4 days 6 tablet 0   • ipratropium (ATROVENT) 0 03 % nasal spray 2 sprays into each nostril every 12 (twelve) hours 30 mL 0   • acetaminophen (TYLENOL) 325 mg tablet Take 650 mg by mouth every 6 (six) hours as needed for mild pain     • ibuprofen (MOTRIN) 600 mg tablet Take 1 tablet (600 mg total) by mouth every 6 (six) hours as needed for mild pain 30 tablet 0   • levothyroxine (Euthyrox) 137 mcg tablet Take 1 tablet (137 mcg total) by mouth daily 90 tablet 2   • other medication, see sig, Medication/product name: prenatal&  supplement   Strength: N/A  Sig (include dose, route, frequency): daily       No current facility-administered medications for this visit  Allergies   Allergen Reactions   • Latex Rash     Very itchy   • Morphine Irritability     Pt states she gets \"angry and violent\" when taking morphine  Review of Systems   Constitutional: Negative  Negative for fever  HENT: Positive for congestion and sinus pressure  Eyes: Negative  Respiratory: Positive for cough  Cardiovascular: Negative  Gastrointestinal: Negative  Endocrine: Negative  Genitourinary: Negative  Musculoskeletal: Negative  Skin: Negative      Allergic/Immunologic: " Negative  Neurological: Negative  Hematological: Negative  Psychiatric/Behavioral: Negative  Video Exam    There were no vitals filed for this visit  Physical Exam  Constitutional:       General: She is not in acute distress  Appearance: She is well-developed  She is not diaphoretic  Neurological:      Mental Status: She is alert and oriented to person, place, and time  Psychiatric:         Behavior: Behavior normal          Thought Content:  Thought content normal          Judgment: Judgment normal           Visit Time  Total Visit Duration: 15 minutes

## 2023-06-09 ENCOUNTER — OFFICE VISIT (OUTPATIENT)
Dept: ENDOCRINOLOGY | Facility: CLINIC | Age: 38
End: 2023-06-09
Payer: COMMERCIAL

## 2023-06-09 VITALS
HEIGHT: 58 IN | HEART RATE: 86 BPM | BODY MASS INDEX: 26.2 KG/M2 | DIASTOLIC BLOOD PRESSURE: 68 MMHG | WEIGHT: 124.8 LBS | SYSTOLIC BLOOD PRESSURE: 110 MMHG

## 2023-06-09 DIAGNOSIS — E03.9 ACQUIRED HYPOTHYROIDISM: Primary | ICD-10-CM

## 2023-06-09 DIAGNOSIS — R19.7 DIARRHEA, UNSPECIFIED TYPE: ICD-10-CM

## 2023-06-09 PROCEDURE — 99214 OFFICE O/P EST MOD 30 MIN: CPT

## 2023-06-09 NOTE — ASSESSMENT & PLAN NOTE
Patient is due for repeat lab work since her dose adjustment which should be done by the end of June  If her lab work remains abnormal, I have discussed with the patient she should be taking a brand form of levothyroxine as this can reduce variability in absorption  I will also check her for celiac disease as this could also be playing a role  If she does have celiac, we discussed using Tirosint which can be costly however there are savings programs we can set the patient up with  Patient states she is compliant with her medication regimen and does not appear to be taking any medications that would affect absorption

## 2023-06-09 NOTE — PROGRESS NOTES
Established Patient Progress Note    CC: Follow up for hypothyroidism    Impression & Plan:    Problem List Items Addressed This Visit        Endocrine    Acquired hypothyroidism - Primary     Patient is due for repeat lab work since her dose adjustment which should be done by the end of June  If her lab work remains abnormal, I have discussed with the patient she should be taking a brand form of levothyroxine as this can reduce variability in absorption  I will also check her for celiac disease as this could also be playing a role  If she does have celiac, we discussed using Tirosint which can be costly however there are savings programs we can set the patient up with  Patient states she is compliant with her medication regimen and does not appear to be taking any medications that would affect absorption  Other Visit Diagnoses     Diarrhea, unspecified type        Relevant Orders    Celiac Disease Antibody Profile          Orders Placed This Encounter   Procedures   • Celiac Disease Antibody Profile     Standing Status:   Future     Standing Expiration Date:   6/9/2024       History of Present Illness:   Beatris Draper is a 40 y o  female with a history of hypothyroidism due to Hashimoto's disease  Her TSH was recently significantly elevated  Her dosage was increased at this time 137 mcg/day  Will be due for repeat labs at the end of June  Patient states she has been compliant with her medications  She takes this every morning at 6 AM on an empty stomach  She reports feeling cold all the time, difficulty sleeping, brain fog, heavy periods and fatigue  Patient also reports having periods of constipation followed by diarrhea which raises concern for irritable bowel versus celiac disease      Patient Active Problem List   Diagnosis   • Acquired hypothyroidism   • Request for sterilization   • Potassium (K) deficiency   • Anemia   • Ex-smoker for less than 1 year   • H/O bilateral "salpingectomy      Past Medical History:   Diagnosis Date   • Anemia    • Disease of thyroid gland    • Placental abruption, delivered 2022   • Potassium (K) deficiency    •  labor in third trimester 2022           Past Surgical History:   Procedure Laterality Date   • TX  DELIVERY ONLY N/A 2022    Procedure:  SECTION (); Surgeon: Harman Oliver MD;  Location: [facility];  Service: Obstetrics   • TX LAPAROSCOPY W/RMVL ADNEXAL STRUCTURES Bilateral 2/10/2023    Procedure: SALPINGECTOMY, LAP;  Surgeon: Tara Fonseca MD;  Location: [facility];  Service: Gynecology      Family History   Problem Relation Age of Onset   • Coronary artery disease Mother    • Diabetes Mother    • Heart attack Mother    • No Known Problems Father    • Diabetes Paternal Grandmother    • Diabetes Paternal Grandfather      Social History     Tobacco Use   • Smoking status: Some Days     Packs/day: 0 25     Years: 20 00     Total pack years: 5 00     Types: Cigarettes     Last attempt to quit:      Years since quittin 4   • Smokeless tobacco: Never   • Tobacco comments:     one pack a week   Substance Use Topics   • Alcohol use: Not Currently     Allergies   Allergen Reactions   • Latex Rash     Very itchy   • Morphine Irritability     Pt states she gets \"angry and violent\" when taking morphine           Current Outpatient Medications:   •  acetaminophen (TYLENOL) 325 mg tablet, Take 650 mg by mouth every 6 (six) hours as needed for mild pain, Disp: , Rfl:   •  ibuprofen (MOTRIN) 600 mg tablet, Take 1 tablet (600 mg total) by mouth every 6 (six) hours as needed for mild pain, Disp: 30 tablet, Rfl: 0  •  ipratropium (ATROVENT) 0 03 % nasal spray, 2 sprays into each nostril every 12 (twelve) hours, Disp: 30 mL, Rfl: 0  •  levothyroxine (Euthyrox) 137 mcg tablet, Take 1 tablet (137 mcg total) by mouth daily, Disp: 90 tablet, Rfl: 2  •  other medication, see sig,, Medication/product name: " "prenatal&  supplement  Strength: N/A Sig (include dose, route, frequency): daily, Disp: , Rfl:     Review of Systems   Constitutional: Positive for fatigue  Negative for chills and fever  HENT: Negative for ear pain and sore throat  Eyes: Negative for pain and visual disturbance  Respiratory: Negative for cough and shortness of breath  Cardiovascular: Negative for chest pain and palpitations  Gastrointestinal: Negative for abdominal pain and vomiting  Endocrine: Positive for cold intolerance  Genitourinary: Positive for menstrual problem  Negative for dysuria and hematuria  Musculoskeletal: Negative for arthralgias and back pain  Skin: Negative for color change and rash  Neurological: Negative for seizures and syncope  Psychiatric/Behavioral: Positive for sleep disturbance  The patient is nervous/anxious  All other systems reviewed and are negative  Physical Exam:  Body mass index is 26 08 kg/m²    /68   Pulse 86   Ht 4' 10\" (1 473 m)   Wt 56 6 kg (124 lb 12 8 oz)   BMI 26 08 kg/m²    Wt Readings from Last 3 Encounters:   23 56 6 kg (124 lb 12 8 oz)   23 56 2 kg (124 lb)   23 55 4 kg (122 lb 3 2 oz)       Physical Exam    Labs:   No results found for: \"HGBA1C\"  Lab Results   Component Value Date    BUN 14 2023     2023    CO2 29 2023    CREATININE 0 82 2023    CREATININE 0 63 2022    K 4 3 2023     eGFR   Date Value Ref Range Status   2023 91 ml/min/1 73sq m Final     No results found for: \"CHOL\", \"CHOLHDL\", \"HDL\", \"LDL\", \"TRIG\"  Lab Results   Component Value Date    ALKPHOS 161 (H) 2022    ALT 14 2022    AST 11 2022     Lab Results   Component Value Date    LSB9BPRGFICY 205 000 (H) 2023    MZH2NBBFHKKE 34 848 (H) 2023    KMV2GFFEUTXB 3 830 10/07/2022     Lab Results   Component Value Date    FREET4 0 64 (L) 2023         There are no Patient Instructions on file for " this visit  Discussed with the patient and all questioned fully answered  She will call me if any problems arise

## 2023-06-30 DIAGNOSIS — E03.9 ACQUIRED HYPOTHYROIDISM: Primary | ICD-10-CM

## 2023-06-30 RX ORDER — LEVOTHYROXINE SODIUM 0.15 MG/1
150 TABLET ORAL DAILY
Qty: 30 TABLET | Refills: 2 | Status: SHIPPED | OUTPATIENT
Start: 2023-06-30 | End: 2023-09-19

## 2023-07-21 ENCOUNTER — APPOINTMENT (OUTPATIENT)
Dept: LAB | Facility: HOSPITAL | Age: 38
End: 2023-07-21
Payer: COMMERCIAL

## 2023-07-21 DIAGNOSIS — R19.7 DIARRHEA, UNSPECIFIED TYPE: ICD-10-CM

## 2023-07-21 PROCEDURE — 86258 DGP ANTIBODY EACH IG CLASS: CPT

## 2023-07-21 PROCEDURE — 86364 TISS TRNSGLTMNASE EA IG CLAS: CPT

## 2023-07-21 PROCEDURE — 86231 EMA EACH IG CLASS: CPT

## 2023-07-21 PROCEDURE — 36415 COLL VENOUS BLD VENIPUNCTURE: CPT

## 2023-07-21 PROCEDURE — 82784 ASSAY IGA/IGD/IGG/IGM EACH: CPT

## 2023-07-22 LAB
ENDOMYSIUM IGA SER QL: NEGATIVE
GLIADIN PEPTIDE IGA SER-ACNC: 8 UNITS (ref 0–19)
GLIADIN PEPTIDE IGG SER-ACNC: 3 UNITS (ref 0–19)
IGA SERPL-MCNC: 456 MG/DL (ref 87–352)
TTG IGA SER-ACNC: <2 U/ML (ref 0–3)
TTG IGG SER-ACNC: <2 U/ML (ref 0–5)

## 2023-08-11 ENCOUNTER — OFFICE VISIT (OUTPATIENT)
Dept: FAMILY MEDICINE CLINIC | Facility: CLINIC | Age: 38
End: 2023-08-11
Payer: COMMERCIAL

## 2023-08-11 VITALS
OXYGEN SATURATION: 98 % | TEMPERATURE: 97.7 F | BODY MASS INDEX: 26.32 KG/M2 | WEIGHT: 125.4 LBS | DIASTOLIC BLOOD PRESSURE: 56 MMHG | SYSTOLIC BLOOD PRESSURE: 114 MMHG | HEIGHT: 58 IN | HEART RATE: 91 BPM

## 2023-08-11 DIAGNOSIS — K05.10 GINGIVITIS: ICD-10-CM

## 2023-08-11 DIAGNOSIS — R59.9 ENLARGED LYMPH NODE: Primary | ICD-10-CM

## 2023-08-11 PROCEDURE — 99213 OFFICE O/P EST LOW 20 MIN: CPT | Performed by: FAMILY MEDICINE

## 2023-08-11 RX ORDER — CHLORHEXIDINE GLUCONATE 0.12 MG/ML
15 RINSE ORAL 2 TIMES DAILY
Qty: 420 ML | Refills: 0 | Status: SHIPPED | OUTPATIENT
Start: 2023-08-11 | End: 2023-08-25

## 2023-08-11 NOTE — ASSESSMENT & PLAN NOTE
Enlarged left submandibular lymph node likely secondary to dental caries and gingivitis without clear source of infection. Due to lack of fevers/systemic symptoms and short duration of lymph node presence would monitor off of antibiotics at this time. Follow up as needed if unable to access dental care.

## 2023-08-11 NOTE — ASSESSMENT & PLAN NOTE
Gingivitis and caries noted on exam with possible periodontal disease - no signs of abscess or infection noted on exam. Recommendation for Peridex rinse over the next 2 weeks. Recommend close follow up with dentistry - referral is provided. Follow up as needed if no improvement or worsening of symptoms.

## 2023-08-11 NOTE — PROGRESS NOTES
Family Medicine Follow-Up Office Visit  Danny Teixeira 40 y.o. female   MRN: [id number] : 1985  ENCOUNTER: 2023 2:51 PM    Assessment and Plan   Enlarged lymph node  Enlarged left submandibular lymph node likely secondary to dental caries and gingivitis without clear source of infection. Due to lack of fevers/systemic symptoms and short duration of lymph node presence would monitor off of antibiotics at this time. Follow up as needed if unable to access dental care. Gingivitis  Gingivitis and caries noted on exam with possible periodontal disease - no signs of abscess or infection noted on exam. Recommendation for Peridex rinse over the next 2 weeks. Recommend close follow up with dentistry - referral is provided. Follow up as needed if no improvement or worsening of symptoms. Chief Complaint     Chief Complaint   Patient presents with   • Jaw Pain       History of Present Illness   Danny Teixeira is a 40y.o.-year-old female who presents today for swelling and tenderness under her left jaw. She reports that she noticed this this morning and that she has noted some improvement over the course of the day. She denies any change in diet. She denies any fever, chills, difficulty with swallowing or breathing. Denies any difficulty with eating or drinking. She does report that she has significant dental issues which have not been addressed as she is looking to establish dental care at a facility that will take her insurance. She denies any recent sinus infections, colds, sore throats. She reports that otherwise she has been in good health. Review of Systems   Review of Systems   Constitutional: Negative for chills and fever. HENT: Positive for facial swelling. Negative for congestion, sore throat, trouble swallowing and voice change. Respiratory: Negative for cough and shortness of breath. Cardiovascular: Negative for chest pain and palpitations. Gastrointestinal: Negative for abdominal pain, blood in stool, constipation, diarrhea, nausea and vomiting. Skin: Negative for rash. Active Problem List     Patient Active Problem List   Diagnosis   • Acquired hypothyroidism   • Request for sterilization   • Potassium (K) deficiency   • Anemia   • Ex-smoker for less than 1 year   • H/O bilateral salpingectomy   • Enlarged lymph node   • Gingivitis       Past Medical History, Past Surgical History, Family History, and Social History were reviewed and updated today as appropriate. Objective   /56 (BP Location: Left arm, Patient Position: Sitting, Cuff Size: Standard)   Pulse 91   Temp 97.7 °F (36.5 °C) (Tympanic)   Ht 4' 10" (1.473 m)   Wt 56.9 kg (125 lb 6.4 oz)   SpO2 98%   BMI 26.21 kg/m²     Physical Exam  Constitutional:       General: She is not in acute distress. Appearance: She is well-developed. She is not ill-appearing. HENT:      Head: Normocephalic and atraumatic. Right Ear: External ear normal.      Left Ear: External ear normal.      Mouth/Throat:      Mouth: Mucous membranes are moist.      Pharynx: Oropharynx is clear. Comments: Poor dentition noted with especially caries noted in the left lower teeth. Gingivitis noted in the surrounding gums. No signs of abscess or current infection  Eyes:      General: No scleral icterus. Conjunctiva/sclera: Conjunctivae normal.   Neck:      Comments: No thyroid enlargement or nodules noted. Enlargement and swelling of left submandibular lymph node. No signs of rash or erythema  Cardiovascular:      Rate and Rhythm: Normal rate and regular rhythm. Heart sounds: Normal heart sounds. Pulmonary:      Effort: Pulmonary effort is normal. No respiratory distress. Breath sounds: Normal breath sounds. No wheezing. Abdominal:      General: Bowel sounds are normal. There is no distension. Palpations: Abdomen is soft. Tenderness:  There is no abdominal tenderness. Musculoskeletal:      Cervical back: Neck supple. Lymphadenopathy:      Cervical: Cervical adenopathy present. Skin:     General: Skin is warm and dry. Neurological:      Mental Status: She is alert and oriented to person, place, and time.            Pertinent Laboratory/Diagnostic Studies:  Lab Results   Component Value Date    BUN 14 01/28/2023    CREATININE 0.82 01/28/2023    CALCIUM 9.2 01/28/2023    K 4.3 01/28/2023    CO2 29 01/28/2023     01/28/2023     Lab Results   Component Value Date    ALT 14 11/06/2022    AST 11 11/06/2022    ALKPHOS 161 (H) 11/06/2022       Lab Results   Component Value Date    WBC 11.48 (H) 01/28/2023    HGB 13.2 01/28/2023    HCT 42.5 01/28/2023    MCV 93 01/28/2023     (H) 01/28/2023       No results found for: "TSH"    No results found for: "CHOL"  No results found for: "TRIG"  No results found for: "HDL"  No results found for: "LDLCALC"  No results found for: "HGBA1C"    Results for orders placed or performed in visit on 07/21/23   Celiac Disease Antibody Profile   Result Value Ref Range    IgA 456 (H) 87 - 352 mg/dL    Gliadin IgA 8 0 - 19 units    Gliadin IgG 3 0 - 19 units    Tissue Transglut Ab IGG <2 0 - 5 U/mL    TISSUE TRANSGLUTAMINASE IGA <2 0 - 3 U/mL    Endomysial IgA Negative Negative       Orders Placed This Encounter   Procedures   • Ambulatory referral to [address] [place] [address]         Current Medications     Current Outpatient Medications   Medication Sig Dispense Refill   • acetaminophen (TYLENOL) 325 mg tablet Take 650 mg by mouth every 6 (six) hours as needed for mild pain     • chlorhexidine (PERIDEX) 0.12 % solution Apply 15 mL to the mouth or throat 2 (two) times a day for 14 days 420 mL 0   • ibuprofen (MOTRIN) 600 mg tablet Take 1 tablet (600 mg total) by mouth every 6 (six) hours as needed for mild pain 30 tablet 0   • levothyroxine (Euthyrox) 150 mcg tablet Take 1 tablet (150 mcg total) by mouth daily 30 tablet 2   • other medication, see sig, Medication/product name: prenatal&  supplement   Strength: N/A  Sig (include dose, route, frequency): daily     • ipratropium (ATROVENT) 0.03 % nasal spray 2 sprays into each nostril every 12 (twelve) hours (Patient not taking: Reported on 2023) 30 mL 0     No current facility-administered medications for this visit. ALLERGIES:  Allergies   Allergen Reactions   • Latex Rash     Very itchy   • Morphine Irritability     Pt states she gets "angry and violent" when taking morphine. Health Maintenance     Health Maintenance   Topic Date Due   • COVID-19 Vaccine (1) Never done   • Pneumococcal Vaccine: Pediatrics (0 to 5 Years) and At-Risk Patients (6 to 59 Years) (1 - PCV) Never done   • BMI: Followup Plan  Never done   • Influenza Vaccine (1) 2023   • Annual Physical  2024   • Depression Screening  2024   • BMI: Adult  2024   • Cervical Cancer Screening  2027   • DTaP,Tdap,and Td Vaccines (2 - Td or Tdap) 2032   • HIV Screening  Completed   • Hepatitis C Screening  Completed   • HIB Vaccine  Aged Out   • IPV Vaccine  Aged Out   • Hepatitis A Vaccine  Aged Out   • Meningococcal ACWY Vaccine  Aged Out   • HPV Vaccine  Aged Out     Immunization History   Administered Date(s) Administered   • Influenza, injectable, quadrivalent, preservative free 0.5 mL 10/07/2022   • Tdap 2022         [facility]    [address]  2023  2:51 PM    Parts of this note were dictated using [employer] dictation software and may have sounds-like errors due to variation in pronunciation.

## 2023-09-19 DIAGNOSIS — E03.9 ACQUIRED HYPOTHYROIDISM: ICD-10-CM

## 2023-09-19 RX ORDER — LEVOTHYROXINE SODIUM 0.15 MG/1
150 TABLET ORAL DAILY
Qty: 30 TABLET | Refills: 0 | Status: SHIPPED | OUTPATIENT
Start: 2023-09-19

## 2023-10-13 DIAGNOSIS — E03.9 ACQUIRED HYPOTHYROIDISM: ICD-10-CM

## 2023-10-13 RX ORDER — LEVOTHYROXINE SODIUM 0.15 MG/1
150 TABLET ORAL DAILY
Qty: 30 TABLET | Refills: 2 | Status: SHIPPED | OUTPATIENT
Start: 2023-10-13

## 2023-10-14 ENCOUNTER — OFFICE VISIT (OUTPATIENT)
Dept: URGENT CARE | Age: 38
End: 2023-10-14
Payer: COMMERCIAL

## 2023-10-14 VITALS
DIASTOLIC BLOOD PRESSURE: 70 MMHG | TEMPERATURE: 97.5 F | SYSTOLIC BLOOD PRESSURE: 113 MMHG | RESPIRATION RATE: 18 BRPM | OXYGEN SATURATION: 99 % | HEART RATE: 63 BPM

## 2023-10-14 DIAGNOSIS — H60.501 ACUTE OTITIS EXTERNA OF RIGHT EAR, UNSPECIFIED TYPE: Primary | ICD-10-CM

## 2023-10-14 PROCEDURE — 99202 OFFICE O/P NEW SF 15 MIN: CPT | Performed by: NURSE PRACTITIONER

## 2023-10-14 RX ORDER — NEOMYCIN SULFATE, POLYMYXIN B SULFATE AND HYDROCORTISONE 10; 3.5; 1 MG/ML; MG/ML; [USP'U]/ML
4 SUSPENSION/ DROPS AURICULAR (OTIC) 4 TIMES DAILY
Qty: 10 ML | Refills: 0 | Status: SHIPPED | OUTPATIENT
Start: 2023-10-14

## 2023-10-14 NOTE — PATIENT INSTRUCTIONS
Swimmer's Ear   AMBULATORY CARE:   Swimmer's ear , also called otitis externa, is an infection in the outer ear canal. This canal goes from the outside of your ear to your eardrum. Swimmer's ear most often occurs when water remains in your ear after you swim. This creates a moist area for bacteria to grow. Scratches or damage from your fingers, cotton swabs, or other objects can also cause swimmer's ear. Common signs and symptoms:   Ear pain    Red, swollen, itchy ear canal    Fluid or pus draining from your ear    A plugged ear and trouble hearing    Seek care immediately if:   You have severe ear pain. You are suddenly not able to hear. You have new swelling in your face, behind your ears, or in your neck. You suddenly cannot move part of your face, or it feels numb. Call your doctor if:   You have a fever. Your symptoms get worse or do not go away, even after treatment. You have questions or concerns about your condition or care. Treatment for swimmer's ear  may include cleaning your outer ear canal first. This will help clean any ear wax, flaky skin, or other discharge. You may then need any of the following:  Medicines:      Ear drops  help fight infection and decrease redness and swelling. Acetaminophen  decreases pain and fever. It is available without a doctor's order. Ask how much to take and how often to take it. Follow directions. Read the labels of all other medicines you are using to see if they also contain acetaminophen, or ask your doctor or pharmacist. Acetaminophen can cause liver damage if not taken correctly. NSAIDs , such as ibuprofen, help decrease swelling, pain, and fever. This medicine is available with or without a doctor's order. NSAIDs can cause stomach bleeding or kidney problems in certain people. If you take blood thinner medicine, always ask your healthcare provider if NSAIDs are safe for you. Always read the medicine label and follow directions.     An ear wick  may be used if your ear canal is blocked. A wick (small tube) made of cotton or gauze is placed in your ear. The wick helps pull extra fluid out of your ear canal. Keenan also help draw medicine into your ear canal.    How to use ear drops:   Warm the bottle of ear drops in your hands  for a few minutes. Lie down on your side with your infected ear facing up. This will help the medicine travel completely through your ear canal.    Gently pull the ear up and back. Carefully drip the correct number of ear drops into your ear. Have another person help you if possible. For children younger than 3 years,  gently pull and hold the ear down and back. For children older than 3 years,  gently pull and hold the ear up and back. Stay in the same position  for 3 to 5 minutes to let the medicine soak in. Prevent swimmer's ear:   Dry your ears completely after you swim or bathe. Gently wipe your outer ear with a soft towel or cloth. Use ear plugs when you swim. Do not put cotton swabs or other objects in your ears. These can scratch or damage your ear. They can also push ear wax deeper in and irritate your ear. Put cotton balls gently in your outer ear  when you apply hair spray, hair dye, or perfume. Follow up with your doctor as directed:  Write down your questions so you remember to ask them during your visits. © Copyright Kendra Oshea 2023 Information is for End User's use only and may not be sold, redistributed or otherwise used for commercial purposes. The above information is an  only. It is not intended as medical advice for individual conditions or treatments. Talk to your doctor, nurse or pharmacist before following any medical regimen to see if it is safe and effective for you.

## 2023-10-14 NOTE — PROGRESS NOTES
[facility]        NAME: Pearl Daniel is a 40 y.o. female  : 1985    MRN: [id number]  DATE: 2023  TIME: 10:47 AM      Assessment and Plan     Acute otitis externa of right ear, unspecified type [H60.501]  1. Acute otitis externa of right ear, unspecified type  neomycin-polymyxin-hydrocortisone (CORTISPORIN) 0.35%-10,000 units/mL-1% otic suspension        Patient suggests using silicone earplugs to avoid missing work. I am agreeable to this since they do not enter canal as deeply as regular earplugs. Recommend silicone earplugs tonight and tomorrow with resumption of regular earplugs on Friday. Patient Instructions     Patient Instructions   Swimmer's Ear   AMBULATORY CARE:   Swimmer's ear , also called otitis externa, is an infection in the outer ear canal. This canal goes from the outside of your ear to your eardrum. Swimmer's ear most often occurs when water remains in your ear after you swim. This creates a moist area for bacteria to grow. Scratches or damage from your fingers, cotton swabs, or other objects can also cause swimmer's ear. Common signs and symptoms:   Ear pain    Red, swollen, itchy ear canal    Fluid or pus draining from your ear    A plugged ear and trouble hearing    Seek care immediately if:   You have severe ear pain. You are suddenly not able to hear. You have new swelling in your face, behind your ears, or in your neck. You suddenly cannot move part of your face, or it feels numb. Call your doctor if:   You have a fever. Your symptoms get worse or do not go away, even after treatment. You have questions or concerns about your condition or care. Treatment for swimmer's ear  may include cleaning your outer ear canal first. This will help clean any ear wax, flaky skin, or other discharge.  You may then need any of the following:  Medicines:      Ear drops  help fight infection and decrease redness and swelling. Acetaminophen  decreases pain and fever. It is available without a doctor's order. Ask how much to take and how often to take it. Follow directions. Read the labels of all other medicines you are using to see if they also contain acetaminophen, or ask your doctor or pharmacist. Acetaminophen can cause liver damage if not taken correctly. NSAIDs , such as ibuprofen, help decrease swelling, pain, and fever. This medicine is available with or without a doctor's order. NSAIDs can cause stomach bleeding or kidney problems in certain people. If you take blood thinner medicine, always ask your healthcare provider if NSAIDs are safe for you. Always read the medicine label and follow directions. An ear wick  may be used if your ear canal is blocked. A wick (small tube) made of cotton or gauze is placed in your ear. The wick helps pull extra fluid out of your ear canal. Keenan also help draw medicine into your ear canal.    How to use ear drops:   Warm the bottle of ear drops in your hands  for a few minutes. Lie down on your side with your infected ear facing up. This will help the medicine travel completely through your ear canal.    Gently pull the ear up and back. Carefully drip the correct number of ear drops into your ear. Have another person help you if possible. For children younger than 3 years,  gently pull and hold the ear down and back. For children older than 3 years,  gently pull and hold the ear up and back. Stay in the same position  for 3 to 5 minutes to let the medicine soak in. Prevent swimmer's ear:   Dry your ears completely after you swim or bathe. Gently wipe your outer ear with a soft towel or cloth. Use ear plugs when you swim. Do not put cotton swabs or other objects in your ears. These can scratch or damage your ear. They can also push ear wax deeper in and irritate your ear.     Put cotton balls gently in your outer ear  when you apply hair spray, hair dye, or perfume. Follow up with your doctor as directed:  Write down your questions so you remember to ask them during your visits. © Copyright [facility] 2023 Information is for End User's use only and may not be sold, redistributed or otherwise used for commercial purposes. The above information is an  only. It is not intended as medical advice for individual conditions or treatments. Talk to your doctor, nurse or pharmacist before following any medical regimen to see if it is safe and effective for you. Follow up with PCP in 3-5 days. Proceed to  ER if symptoms worsen. 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 Last night pt felt right ear pop, when pt took earplug out fluid poured out. Feels tender now, not much last night. Was throbbing last night, now just dull pain. Takes advil regularly for work. Pt has an issue in left ear usually. History of Present Illness     Patient reports that she wears traditional earplugs at work (Fri-Sun evenings in fall). Yesterday she felt popping and had fluid drain from right ear when earplug removed. Now it is tender but not overly painful. Hearing is normal.  Notes hx of left eardrum bleb; states that she wanted to have right ear checked. Review of Systems     Review of Systems   HENT:  Positive for ear discharge and ear pain. Negative for hearing loss. All other systems reviewed and are negative.         Current Medications       Current Outpatient Medications:     acetaminophen (TYLENOL) 325 mg tablet, Take 650 mg by mouth every 6 (six) hours as needed for mild pain, Disp: , Rfl:     ibuprofen (MOTRIN) 600 mg tablet, Take 1 tablet (600 mg total) by mouth every 6 (six) hours as needed for mild pain, Disp: 30 tablet, Rfl: 0    levothyroxine 150 mcg tablet, take 1 tablet by mouth daily, Disp: 30 tablet, Rfl: 2 neomycin-polymyxin-hydrocortisone (CORTISPORIN) 0.35%-10,000 units/mL-1% otic suspension, Administer 4 drops to the right ear 4 (four) times a day, Disp: 10 mL, Rfl: 0    other medication, see sig,, Medication/product name: prenatal&  supplement  Strength: N/A Sig (include dose, route, frequency): daily, Disp: , Rfl:     ipratropium (ATROVENT) 0.03 % nasal spray, 2 sprays into each nostril every 12 (twelve) hours (Patient not taking: Reported on 2023), Disp: 30 mL, Rfl: 0    Current Allergies     Allergies as of 10/14/2023 - Reviewed 10/14/2023   Allergen Reaction Noted    Latex Rash 2015    Morphine Irritability 2022              The following portions of the patient's history were reviewed and updated as appropriate: allergies, current medications, past family history, past medical history, past social history, past surgical history and problem list.     Past Medical History:   Diagnosis Date    Anemia     Disease of thyroid gland     Placental abruption, delivered 2022    Potassium (K) deficiency      labor in third trimester 2022            Past Surgical History:   Procedure Laterality Date    MO  DELIVERY ONLY N/A 2022    Procedure:  SECTION (); Surgeon: Haley Adams. Padmini Davis MD;  Location: [facility];  Service: Obstetrics    MO LAPAROSCOPY W/RMVL ADNEXAL STRUCTURES Bilateral 2/10/2023    Procedure: SALPINGECTOMY, LAP;  Surgeon: Keiko Donato MD;  Location: [facility] OR;  Service: Gynecology       Family History   Problem Relation Age of Onset    Coronary artery disease Mother     Diabetes Mother     Heart attack Mother     No Known Problems Father     Diabetes Paternal Grandmother     Diabetes Paternal Grandfather          Medications have been verified. Objective     /70   Pulse 63   Temp 97.5 °F (36.4 °C) (Tympanic)   Resp 18   SpO2 99%   No LMP recorded. Patient has had an ablation.          Physical Exam     Physical Exam  Vitals and nursing note reviewed. Constitutional:       General: She is not in acute distress. Appearance: Normal appearance. She is well-developed. She is not ill-appearing, toxic-appearing or diaphoretic. HENT:      Head: Normocephalic and atraumatic. Right Ear: No decreased hearing noted. Drainage, swelling and tenderness present. Tympanic membrane is not erythematous. Left Ear: Tympanic membrane, ear canal and external ear normal. Tympanic membrane is not erythematous. Ears:      Comments: Mild erythema and swelling of right ear canal with slight moisture/drainage. Also noted is a minor blood blister at 8-9 o'clock about 1/2 to 2/3 of the way into the canal (about where the earplug likely stopped/rubbed). No active bleeding. TM normal and healthy. Eyes:      Pupils: Pupils are equal, round, and reactive to light. Pulmonary:      Effort: Pulmonary effort is normal. No respiratory distress. Abdominal:      General: There is no distension. Palpations: Abdomen is soft. Musculoskeletal:         General: Normal range of motion. Cervical back: Normal range of motion and neck supple. Skin:     General: Skin is warm and dry. Capillary Refill: Capillary refill takes less than 2 seconds. Neurological:      General: No focal deficit present. Mental Status: She is alert and oriented to person, place, and time. Psychiatric:         Mood and Affect: Mood and affect normal.         Behavior: Behavior normal. Behavior is cooperative. Thought Content:  Thought content normal.         Judgment: Judgment normal.

## 2024-04-03 NOTE — PROGRESS NOTES
Assessment        Diagnoses and all orders for this visit:    Encntr for gyn exam (general) (routine) w/o abn findings    Skin lesion on examination  -     Ambulatory Referral to Dermatology; Future             Plan      All questions answered.  Contraception: salpingectomy.  Discussed healthy lifestyle modifications.  Educational material distributed.  Follow up in 1 year.  Follow up as needed.   Referred to dermatology for mons pubis skin lesion  Pap smear deferred    Subjective      Damon Andrews is a 38 y.o. female who presents for annual exam.      Chief Complaint   Patient presents with    Gynecologic Exam     No concerns     She has no problems        Last Pap: 22  Current contraception:  salpingectomy  History of abnormal Pap smear: no  History of abnormal mammogram: no  Family history of uterine or ovarian cancer: no  Family history of breast cancer: no  Family history of colon cancer: no    Unsure family history    OB History    Para Term  AB Living   6 3 2 1 3 3   SAB IAB Ectopic Multiple Live Births   1 2 0 1 3      # Outcome Date GA Lbr Kayode/2nd Weight Sex Delivery Anes PTL Lv   6  22 34w4d  2030 g (4 lb 7.6 oz) M CS-LTranv EPI, Gen Y PRACHI      Complications: Fetal Intolerance, Abruptio Placenta      Name: JOHNSON,BABY BOY (DAMON)      Apgar1: 6  Apgar5: 8   5 SAB 2020 6w0d    SAB      4 IAB 2013 16w0d             Birth Comments: Multiple anomalies- reports only a head developed. D&C   3 Term 12 38w0d   M Vag-Spont EPI  PRACHI      Complications: Nuchal cord affecting delivery   2 Term 08/09/10 40w0d   M Vag-Spont EPI  PRACHI   1A IAB  21w0d   M          Birth Comments: multiple anomalies, termination D&C   1B IAB  21w0d   F          Birth Comments: multiple anomalies. termination. D&C       Menstrual History:  OB History          6    Para   3    Term   2       1    AB   3    Living   3         SAB   1    IAB   2    Ectopic   0     Multiple   1    Live Births   3                Menarche age: 12  Patient's last menstrual period was 2024 (approximate).             Past Medical History:   Diagnosis Date    Anemia     Disease of thyroid gland     Placental abruption, delivered 2022    Potassium (K) deficiency      labor in third trimester 2022          Past Surgical History:   Procedure Laterality Date    ND  DELIVERY ONLY N/A 2022    Procedure:  SECTION ();  Surgeon: Shaila Hawk MD;  Location: [facility] ;  Service: Obstetrics    ND LAPAROSCOPY W/RMVL ADNEXAL STRUCTURES Bilateral 2/10/2023    Procedure: SALPINGECTOMY, LAP;  Surgeon: Neva Menjivar MD;  Location: [facility];  Service: Gynecology     Family History   Problem Relation Age of Onset    Coronary artery disease Mother     Diabetes Mother     Heart attack Mother     No Known Problems Father     Diabetes Paternal Grandmother     Diabetes Paternal Grandfather        Social History     Tobacco Use    Smoking status: Former     Current packs/day: 0.00     Average packs/day: 0.3 packs/day for 20.0 years (5.0 ttl pk-yrs)     Types: Cigarettes     Start date:      Quit date:      Years since quittin.2    Smokeless tobacco: Never    Tobacco comments:     one pack a week   Vaping Use    Vaping status: Some Days    Substances: Flavoring   Substance Use Topics    Alcohol use: Not Currently    Drug use: Not Currently          Current Outpatient Medications:     acetaminophen (TYLENOL) 325 mg tablet, Take 650 mg by mouth every 6 (six) hours as needed for mild pain, Disp: , Rfl:     ibuprofen (MOTRIN) 600 mg tablet, Take 1 tablet (600 mg total) by mouth every 6 (six) hours as needed for mild pain, Disp: 30 tablet, Rfl: 0    levothyroxine 150 mcg tablet, take 1 tablet by mouth daily, Disp: 30 tablet, Rfl: 2    other medication, see sig,, Medication/product name: prenatal&  supplement  Strength: N/A Sig (include dose, route,  "frequency): daily, Disp: , Rfl:     ipratropium (ATROVENT) 0.03 % nasal spray, 2 sprays into each nostril every 12 (twelve) hours (Patient not taking: Reported on 8/11/2023), Disp: 30 mL, Rfl: 0    neomycin-polymyxin-hydrocortisone (CORTISPORIN) 0.35%-10,000 units/mL-1% otic suspension, Administer 4 drops to the right ear 4 (four) times a day (Patient not taking: Reported on 4/5/2024), Disp: 10 mL, Rfl: 0    Allergies   Allergen Reactions    Latex Rash     Very itchy    Morphine Irritability     Pt states she gets \"angry and violent\" when taking morphine.           Review of Systems   Constitutional: Negative.    HENT: Negative.     Eyes: Negative.    Respiratory: Negative.     Cardiovascular: Negative.    Gastrointestinal: Negative.    Endocrine: Negative.    Genitourinary:         As noted in HPI   Musculoskeletal: Negative.    Skin: Negative.    Allergic/Immunologic: Negative.    Neurological: Negative.    Hematological: Negative.    Psychiatric/Behavioral: Negative.         /72 (BP Location: Right arm, Patient Position: Sitting, Cuff Size: Adult)   Pulse 86   Ht 4' 10\" (1.473 m)   Wt 57.3 kg (126 lb 4.8 oz)   LMP 03/06/2024 (Approximate)   SpO2 98%   BMI 26.40 kg/m²         Physical Exam  Constitutional:       Appearance: She is well-developed.   Genitourinary:      Vulva, bladder and rectum normal.      No lesions in the vagina.      Genitourinary Comments:   Lesion 1.5 cm mons pubis, raised with wide base - sent to dermatology      Right Labia: No rash, tenderness, lesions, skin changes or Bartholin's cyst.     Left Labia: No tenderness, lesions, skin changes, Bartholin's cyst or rash.           No inguinal adenopathy present in the right or left side.     No vaginal discharge, tenderness or bleeding.      No vaginal prolapse present.     No vaginal atrophy present.       Right Adnexa: not tender, not full and no mass present.     Left Adnexa: not tender, not full and no mass present.     No " cervical motion tenderness, friability, lesion or polyp.      Uterus is not enlarged or tender.      Pelvic exam was performed with patient in the lithotomy position.   Rectum:      No external hemorrhoid.   Breasts:     Right: No mass, nipple discharge, skin change or tenderness.      Left: No mass, nipple discharge, skin change or tenderness.   HENT:      Head: Normocephalic.      Nose: Nose normal.   Eyes:      Conjunctiva/sclera: Conjunctivae normal.   Neck:      Thyroid: No thyromegaly.   Cardiovascular:      Rate and Rhythm: Normal rate and regular rhythm.      Heart sounds: Normal heart sounds. No murmur heard.  Pulmonary:      Effort: Pulmonary effort is normal. No respiratory distress.      Breath sounds: Normal breath sounds. No wheezing or rales.   Abdominal:      General: There is no distension.      Palpations: Abdomen is soft. There is no mass.      Tenderness: There is no abdominal tenderness. There is no guarding or rebound.   Musculoskeletal:         General: No tenderness.      Cervical back: Neck supple. No muscular tenderness.   Lymphadenopathy:      Cervical: No cervical adenopathy.      Lower Body: No right inguinal adenopathy. No left inguinal adenopathy.   Neurological:      Mental Status: She is alert and oriented to person, place, and time.   Skin:     General: Skin is warm and dry.   Psychiatric:         Mood and Affect: Mood normal.         Behavior: Behavior normal.           No future appointments.

## 2024-04-05 ENCOUNTER — ANNUAL EXAM (OUTPATIENT)
Dept: OBGYN CLINIC | Facility: CLINIC | Age: 39
End: 2024-04-05
Payer: COMMERCIAL

## 2024-04-05 VITALS
SYSTOLIC BLOOD PRESSURE: 108 MMHG | DIASTOLIC BLOOD PRESSURE: 72 MMHG | OXYGEN SATURATION: 98 % | WEIGHT: 126.3 LBS | HEIGHT: 58 IN | BODY MASS INDEX: 26.51 KG/M2 | HEART RATE: 86 BPM

## 2024-04-05 DIAGNOSIS — L98.9 SKIN LESION ON EXAMINATION: ICD-10-CM

## 2024-04-05 DIAGNOSIS — Z01.419 ENCNTR FOR GYN EXAM (GENERAL) (ROUTINE) W/O ABN FINDINGS: Primary | ICD-10-CM

## 2024-04-05 PROCEDURE — 99395 PREV VISIT EST AGE 18-39: CPT | Performed by: OBSTETRICS & GYNECOLOGY

## 2024-06-18 ENCOUNTER — TELEPHONE (OUTPATIENT)
Age: 39
End: 2024-06-18

## 2024-06-18 NOTE — TELEPHONE ENCOUNTER
Patient calling to see if she can get a note for work for her hashimoto's thyroiditis. She said when she has flare ups they will switch her position for the day where she doesn't need to be in a costume. 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 Please call patient when this is done or possible.

## 2025-04-02 ENCOUNTER — TELEPHONE (OUTPATIENT)
Dept: DERMATOLOGY | Facility: CLINIC | Age: 40
End: 2025-04-02

## 2025-05-11 NOTE — PROGRESS NOTES
Assessment        Diagnoses and all orders for this visit:    Encounter for gynecological examination (general) (routine) without abnormal findings    Cervical cancer screening  -     Liquid-based pap, screening    H/O bilateral salpingectomy    Encounter for screening mammogram for breast cancer  -     Mammo screening bilateral w 3d and cad; Future    Perimenopausal symptom  -     Follicle stimulating hormone; Future  -     TSH, 3rd generation with Free T4 reflex; Future  -     Prolactin; Future  -     hCG, quantitative; Future             Plan      All questions answered.  Await pap smear results.  Blood tests: FSH, Prolactin hormone level, Quantitative hCG, and TSH.  Contraception: salpingectomy.  Educational material distributed.  Follow up in 1 year.  Follow up as needed.  Mammogram.  Pap smear.   Labs ordered due to perimenopausal symptoms -will call with results      Subjective      Damon Andrews is a 39 y.o. female who presents for annual exam.      Chief Complaint   Patient presents with    Gynecologic Exam       She has no problems  Periods are lighter and shorter, some hot flashes  Trouble sleeping    Last Pap: 2022  Last mammogram: Not on file  Colorectal cancer screening: Cologuard: Not on file Colonoscopy: Not on file   DEXA: Not on file     Last Pap: 22  Current contraception:  salpingectomy  History of abnormal Pap smear: no  History of abnormal mammogram: no  Family history of uterine or ovarian cancer: no  Family history of breast cancer: no  Family history of colon cancer: no    OB History    Para Term  AB Living   6 3 2 1 3 3   SAB IAB Ectopic Multiple Live Births   1 2 0 1 3      # Outcome Date GA Lbr Kayode/2nd Weight Sex Type Anes PTL Lv   6  22 34w4d  2030 g (4 lb 7.6 oz) M CS-LTranv EPI, Gen Y PRACHI      Complications: Fetal Intolerance, Abruptio Placenta      Name: JOHNSON,BABY BOY (DAMON)      Apgar1: 6  Apgar5: 8   5 SAB 2020 6w0d    SAB       4 IAB 2013 16w0d             Birth Comments: Multiple anomalies- reports only a head developed. D&C   3 Term 12 38w0d   M Vag-Spont EPI  PRACHI      Complications: Nuchal cord affecting delivery   2 Term 08/09/10 40w0d   M Vag-Spont EPI  PRACHI   1A IAB  21w0d   M          Birth Comments: multiple anomalies, termination D&C   1B IAB  21w0d   F          Birth Comments: multiple anomalies. termination. D&C       Menstrual History:  OB History          6    Para   3    Term   2       1    AB   3    Living   3         SAB   1    IAB   2    Ectopic   0    Multiple   1    Live Births   3                Menarche age: 12  Patient's last menstrual period was 2025.       Social History     Substance and Sexual Activity   Sexual Activity Yes    Partners: Male    Birth control/protection: Female Sterilization          Past Medical History:   Diagnosis Date    Anemia     Disease of thyroid gland     Placental abruption, delivered 2022    Potassium (K) deficiency      labor in third trimester 2022          Past Surgical History:   Procedure Laterality Date    [place]  DELIVERY ONLY N/A 2022    Procedure:  SECTION ();  Surgeon: Shaila Hawk MD;  Location[facility];  Service: Obstetrics    [place] LAPAROSCOPY W/RMVL ADNEXAL STRUCTURES Bilateral 2/10/2023    Procedure: SALPINGECTOMY, LAP;  Surgeon: Neva Menjivar MD;  Location: [facility] OR;  Service: Gynecology     Family History   Problem Relation Age of Onset    Coronary artery disease Mother     Diabetes Mother     Heart attack Mother     No Known Problems Father     Diabetes Paternal Grandmother     Diabetes Paternal Grandfather        Social History     Tobacco Use    Smoking status: Former     Current packs/day: 0.00     Average packs/day: 0.3 packs/day for 20.0 years (5.0 ttl pk-yrs)     Types: Cigarettes     Start date:      Quit date:      Years since quittin.3    Smokeless tobacco: Never  "   Tobacco comments:     one pack a week   Vaping Use    Vaping status: Some Days    Substances: Flavoring   Substance Use Topics    Alcohol use: Not Currently    Drug use: Not Currently          Current Outpatient Medications:     acetaminophen (TYLENOL) 325 mg tablet, Take 650 mg by mouth every 6 (six) hours as needed for mild pain, Disp: , Rfl:     ibuprofen (MOTRIN) 600 mg tablet, Take 1 tablet (600 mg total) by mouth every 6 (six) hours as needed for mild pain, Disp: 30 tablet, Rfl: 0    ipratropium (ATROVENT) 0.03 % nasal spray, 2 sprays into each nostril every 12 (twelve) hours (Patient not taking: Reported on 2023), Disp: 30 mL, Rfl: 0    levothyroxine 150 mcg tablet, take 1 tablet by mouth daily (Patient not taking: Reported on 2025), Disp: 30 tablet, Rfl: 2    neomycin-polymyxin-hydrocortisone (CORTISPORIN) 0.35%-10,000 units/mL-1% otic suspension, Administer 4 drops to the right ear 4 (four) times a day (Patient not taking: Reported on 2025), Disp: 10 mL, Rfl: 0    other medication, see sig,, Medication/product name: prenatal&  supplement  Strength: N/A Sig (include dose, route, frequency): daily (Patient not taking: Reported on 2025), Disp: , Rfl:     Allergies   Allergen Reactions    Latex Rash     Very itchy    Morphine Irritability     Pt states she gets \"angry and violent\" when taking morphine.           Review of Systems   Constitutional: Negative.    HENT: Negative.     Eyes: Negative.    Respiratory: Negative.     Cardiovascular: Negative.    Gastrointestinal: Negative.    Endocrine: Negative.    Genitourinary:         As noted in HPI   Musculoskeletal: Negative.    Skin: Negative.    Allergic/Immunologic: Negative.    Neurological: Negative.    Hematological: Negative.    Psychiatric/Behavioral: Negative.         /70 (BP Location: Right arm, Patient Position: Sitting, Cuff Size: Standard)   Pulse 62   Ht 4' 10\" (1.473 m)   Wt 61.2 kg (135 lb)   LMP " 03/30/2025   BMI 28.22 kg/m²         Physical Exam  Constitutional:       Appearance: She is well-developed.   Genitourinary:      Vulva, bladder and rectum normal.      No lesions in the vagina.      Genitourinary Comments:         Right Labia: No rash, tenderness, lesions, skin changes or Bartholin's cyst.     Left Labia: No tenderness, lesions, skin changes, Bartholin's cyst or rash.     No inguinal adenopathy present in the right or left side.     No vaginal discharge, tenderness or bleeding.      No vaginal prolapse present.     No vaginal atrophy present.       Right Adnexa: not tender, not full and no mass present.     Left Adnexa: not tender, not full and no mass present.     No cervical motion tenderness, friability, lesion or polyp.      Uterus is not enlarged or tender.      Pelvic exam was performed with patient in the lithotomy position.   Rectum:      No external hemorrhoid.   Breasts:     Right: No mass, nipple discharge, skin change or tenderness.      Left: No mass, nipple discharge, skin change or tenderness.   HENT:      Head: Normocephalic.      Nose: Nose normal.   Eyes:      Conjunctiva/sclera: Conjunctivae normal.   Neck:      Thyroid: No thyromegaly.   Cardiovascular:      Rate and Rhythm: Normal rate and regular rhythm.      Heart sounds: Normal heart sounds. No murmur heard.  Pulmonary:      Effort: Pulmonary effort is normal. No respiratory distress.      Breath sounds: Normal breath sounds. No wheezing or rales.   Abdominal:      General: There is no distension.      Palpations: Abdomen is soft. There is no mass.      Tenderness: There is no abdominal tenderness. There is no guarding or rebound.   Musculoskeletal:         General: No tenderness.      Cervical back: Neck supple. No muscular tenderness.   Lymphadenopathy:      Cervical: No cervical adenopathy.      Lower Body: No right inguinal adenopathy. No left inguinal adenopathy.   Neurological:      Mental Status: She is alert and  oriented to person, place, and time.   Skin:     General: Skin is warm and dry.   Psychiatric:         Mood and Affect: Mood normal.         Behavior: Behavior normal.   Vitals and nursing note reviewed. Exam conducted with a chaperone present.             No future appointments.

## 2025-05-12 ENCOUNTER — ANNUAL EXAM (OUTPATIENT)
Dept: OBGYN CLINIC | Facility: CLINIC | Age: 40
End: 2025-05-12

## 2025-05-12 VITALS
SYSTOLIC BLOOD PRESSURE: 108 MMHG | DIASTOLIC BLOOD PRESSURE: 70 MMHG | BODY MASS INDEX: 28.34 KG/M2 | WEIGHT: 135 LBS | HEART RATE: 62 BPM | HEIGHT: 58 IN

## 2025-05-12 DIAGNOSIS — Z12.4 CERVICAL CANCER SCREENING: ICD-10-CM

## 2025-05-12 DIAGNOSIS — Z90.79 H/O BILATERAL SALPINGECTOMY: ICD-10-CM

## 2025-05-12 DIAGNOSIS — Z01.419 ENCOUNTER FOR GYNECOLOGICAL EXAMINATION (GENERAL) (ROUTINE) WITHOUT ABNORMAL FINDINGS: Primary | ICD-10-CM

## 2025-05-12 DIAGNOSIS — Z12.31 ENCOUNTER FOR SCREENING MAMMOGRAM FOR BREAST CANCER: ICD-10-CM

## 2025-05-12 DIAGNOSIS — N95.1 PERIMENOPAUSAL SYMPTOM: ICD-10-CM

## 2025-05-12 PROCEDURE — G0476 HPV COMBO ASSAY CA SCREEN: HCPCS | Performed by: OBSTETRICS & GYNECOLOGY

## 2025-05-12 PROCEDURE — G0145 SCR C/V CYTO,THINLAYER,RESCR: HCPCS | Performed by: OBSTETRICS & GYNECOLOGY

## 2025-05-14 ENCOUNTER — RESULTS FOLLOW-UP (OUTPATIENT)
Dept: OBGYN CLINIC | Facility: CLINIC | Age: 40
End: 2025-05-14

## 2025-05-14 ENCOUNTER — APPOINTMENT (OUTPATIENT)
Dept: LAB | Facility: CLINIC | Age: 40
End: 2025-05-14
Payer: COMMERCIAL

## 2025-05-14 DIAGNOSIS — N95.1 PERIMENOPAUSAL SYMPTOM: ICD-10-CM

## 2025-05-14 LAB
B-HCG SERPL-ACNC: <0.6 MIU/ML (ref 0–5)
FSH SERPL-ACNC: 2.5 MIU/ML
PROLACTIN SERPL-MCNC: 25.19 NG/ML (ref 3.34–26.72)
T4 FREE SERPL-MCNC: <0.25 NG/DL (ref 0.61–1.12)
TSH SERPL DL<=0.05 MIU/L-ACNC: 312.9 UIU/ML (ref 0.45–4.5)

## 2025-05-14 PROCEDURE — 84702 CHORIONIC GONADOTROPIN TEST: CPT

## 2025-05-14 PROCEDURE — 84146 ASSAY OF PROLACTIN: CPT

## 2025-05-14 PROCEDURE — 36415 COLL VENOUS BLD VENIPUNCTURE: CPT

## 2025-05-14 PROCEDURE — 83001 ASSAY OF GONADOTROPIN (FSH): CPT

## 2025-05-14 PROCEDURE — 84443 ASSAY THYROID STIM HORMONE: CPT

## 2025-05-14 PROCEDURE — 84439 ASSAY OF FREE THYROXINE: CPT

## 2025-05-15 LAB
LAB AP GYN PRIMARY INTERPRETATION: NORMAL
Lab: NORMAL

## 2025-05-21 DIAGNOSIS — E03.9 ACQUIRED HYPOTHYROIDISM: ICD-10-CM

## 2025-05-22 RX ORDER — LEVOTHYROXINE SODIUM 150 UG/1
150 TABLET ORAL DAILY
Qty: 30 TABLET | Refills: 0 | OUTPATIENT
Start: 2025-05-22

## 2025-05-22 NOTE — TELEPHONE ENCOUNTER
Spoke with patient, and she has an apt with PCP next week and will ask for refills. Moved patient up to 6/12/25 with Qing

## 2025-05-28 ENCOUNTER — OFFICE VISIT (OUTPATIENT)
Dept: FAMILY MEDICINE CLINIC | Facility: CLINIC | Age: 40
End: 2025-05-28
Payer: COMMERCIAL

## 2025-05-28 VITALS
BODY MASS INDEX: 28.13 KG/M2 | DIASTOLIC BLOOD PRESSURE: 68 MMHG | HEIGHT: 58 IN | WEIGHT: 134 LBS | OXYGEN SATURATION: 98 % | SYSTOLIC BLOOD PRESSURE: 108 MMHG | TEMPERATURE: 98.6 F | HEART RATE: 86 BPM

## 2025-05-28 DIAGNOSIS — Z00.00 WELL ADULT EXAM: Primary | ICD-10-CM

## 2025-05-28 PROCEDURE — 99395 PREV VISIT EST AGE 18-39: CPT | Performed by: FAMILY MEDICINE

## 2025-05-29 NOTE — PROGRESS NOTES
"Name: Beatriz Andrews      : 1985      MRN: [id number]  Encounter Provider: Fantasma Newman DO  Encounter Date: 2025   Encounter department: [facility]  :  Assessment & Plan  Well adult exam  Anticipatory guidance provided.  Recommend good diet and regular exercise.  Recommend regular follow-up with gynecologist as well.  Physical form completed for driving.  See chart copy.  Recommend recheck again in 1 year or sooner if needed.              History of Present Illness   Patient is here for physical.  Generally well.  No new concerns or complaints today.      Review of Systems   Constitutional: Negative.    HENT: Negative.     Eyes: Negative.    Respiratory: Negative.     Cardiovascular: Negative.    Gastrointestinal: Negative.    Endocrine: Negative.    Genitourinary: Negative.    Musculoskeletal: Negative.    Skin: Negative.    Allergic/Immunologic: Negative.    Neurological: Negative.    Hematological: Negative.    Psychiatric/Behavioral: Negative.         Objective   /68   Pulse 86   Temp 98.6 °F (37 °C)   Ht 4' 10\" (1.473 m)   Wt 60.8 kg (134 lb)   LMP 2025   SpO2 98%   BMI 28.01 kg/m²      Physical Exam  Constitutional:       General: She is not in acute distress.     Appearance: She is well-developed. She is not diaphoretic.   HENT:      Head: Normocephalic and atraumatic.      Right Ear: External ear normal.      Left Ear: External ear normal.      Nose: Nose normal.      Mouth/Throat:      Pharynx: Oropharynx is clear.     Eyes:      General: No scleral icterus.        Right eye: No discharge.         Left eye: No discharge.      Conjunctiva/sclera: Conjunctivae normal.      Pupils: Pupils are equal, round, and reactive to light.     Neck:      Thyroid: No thyromegaly.      Vascular: No JVD.      Trachea: No tracheal deviation.     Cardiovascular:      Rate and Rhythm: Normal rate and regular rhythm.      Heart sounds: Normal heart sounds. " No murmur heard.     No friction rub. No gallop.   Pulmonary:      Effort: Pulmonary effort is normal. No respiratory distress.      Breath sounds: Normal breath sounds. No wheezing or rales.   Chest:      Chest wall: No tenderness.   Abdominal:      General: Bowel sounds are normal. There is no distension.      Palpations: Abdomen is soft. There is no mass.      Tenderness: There is no abdominal tenderness. There is no guarding or rebound.      Hernia: No hernia is present.     Musculoskeletal:         General: No tenderness or deformity. Normal range of motion.      Cervical back: Normal range of motion and neck supple.   Lymphadenopathy:      Cervical: No cervical adenopathy.     Skin:     General: Skin is warm and dry.      Capillary Refill: Capillary refill takes less than 2 seconds.      Coloration: Skin is not pale.      Findings: No erythema or rash.     Neurological:      Mental Status: She is alert and oriented to person, place, and time.      Cranial Nerves: No cranial nerve deficit.      Sensory: No sensory deficit.      Motor: No abnormal muscle tone.      Coordination: Coordination normal.      Deep Tendon Reflexes: Reflexes normal.     Psychiatric:         Mood and Affect: Mood normal.         Behavior: Behavior normal.

## 2025-06-12 ENCOUNTER — OFFICE VISIT (OUTPATIENT)
Dept: ENDOCRINOLOGY | Facility: CLINIC | Age: 40
End: 2025-06-12
Payer: COMMERCIAL

## 2025-06-12 VITALS
BODY MASS INDEX: 27.08 KG/M2 | WEIGHT: 129 LBS | HEART RATE: 53 BPM | DIASTOLIC BLOOD PRESSURE: 68 MMHG | SYSTOLIC BLOOD PRESSURE: 110 MMHG | HEIGHT: 58 IN

## 2025-06-12 DIAGNOSIS — E03.9 ACQUIRED HYPOTHYROIDISM: Primary | ICD-10-CM

## 2025-06-12 DIAGNOSIS — E04.1 THYROID NODULE: ICD-10-CM

## 2025-06-12 PROCEDURE — 99214 OFFICE O/P EST MOD 30 MIN: CPT

## 2025-06-12 RX ORDER — LEVOTHYROXINE SODIUM 150 UG/1
150 TABLET ORAL DAILY
Qty: 45 TABLET | Refills: 2 | Status: SHIPPED | OUTPATIENT
Start: 2025-06-12

## 2025-06-12 NOTE — PROGRESS NOTES
Name: Beatriz Andrews      : 1985      MRN: [id number]  Encounter Provider: VIC Melo  Encounter Date: 2025   Encounter department: [facility] [place]    Chief Complaint   Patient presents with    Hypothyroidism     History of Present Illness   History of Present Illness  This is a 39-year-old female here for follow-up of hypothyroidism due to Hashimoto's disease.  She has been off of her medication for several months.  She reports feeling well today.  She did start a supplement which is helping with her energy levels.  She has no complaints or concerns today.  TSH on lab work is 312 and T4 is undetectable.    She was experiencing dental pain imaging of the soft tissue incidentally revealed a 2 cm thyroid nodule.  She denies any trouble swallowing breathing or change in voice.  Denies any family or personal history of thyroid cancer.  Denies any exposure to head or neck radiation as a child.    Assessment & Plan    Hypothyroidism- restart brand LT4 at prior dose and repeat lab work in 6 weeks.  I do not know the contents of her supplement therefore I will ask her to stop it 1 week prior to her lab work.  Thyroid nodule-we will obtain a thyroid ultrasound to further evaluate.          Review of Systems   Constitutional:  Negative for chills and fever.   HENT:  Negative for ear pain and sore throat.    Eyes:  Negative for pain and visual disturbance.   Respiratory:  Negative for cough and shortness of breath.    Cardiovascular:  Negative for chest pain and palpitations.   Gastrointestinal:  Negative for abdominal pain and vomiting.   Genitourinary:  Negative for dysuria and hematuria.   Musculoskeletal:  Negative for arthralgias and back pain.   Skin:  Negative for color change and rash.   Neurological:  Negative for seizures and syncope.   All other systems reviewed and are negative.   as per HPI       Medical History Reviewed by  "provider this encounter:     .    Objective   /68 (BP Location: Left arm, Patient Position: Sitting, Cuff Size: Adult)   Pulse (!) 53   Ht 4' 10\" (1.473 m)   Wt 58.5 kg (129 lb)   BMI 26.96 kg/m²      Body mass index is 26.96 kg/m².  Wt Readings from Last 3 Encounters:   06/12/25 58.5 kg (129 lb)   05/28/25 60.8 kg (134 lb)   05/12/25 61.2 kg (135 lb)     Physical Exam  Vitals reviewed.   HENT:      Head: Normocephalic and atraumatic.     Cardiovascular:      Rate and Rhythm: Bradycardia present.   Pulmonary:      Effort: Pulmonary effort is normal.     Neurological:      Mental Status: She is alert.       Physical Exam        Results    Labs:   No results found for: \"HGBA1C\"  Lab Results   Component Value Date    CREATININE 0.75 06/08/2025    CREATININE 0.82 01/28/2023    CREATININE 0.63 11/06/2022    BUN 12 06/08/2025    K 3.7 06/08/2025     06/08/2025    CO2 26 06/08/2025     GFR, Calculated   Date Value Ref Range Status   02/05/2020 103 >60 mL/min/1.73m2 Final     Comment:     mL/min per 1.73 square meters                                            Normal Function or Mild Renal    Disease (if clinically at risk):  >or=60  Moderately Decreased:                30-59  Severely Decreased:                  15-29  Renal Failure:                         <15                                            -American GFR: multiply reported GFR by 1.16    Please note that the eGFR is based on the CKD-EPI calculation, and is not intended to be used for drug dosing.     eGFRcr   Date Value Ref Range Status   06/08/2025 103 >59 Final     No results found for: \"CHOL\", \"HDL\", \"LDL\", \"TRIG\", \"CHOLHDL\"  Lab Results   Component Value Date    ALT 10 06/08/2025    AST 15 06/08/2025    ALKPHOS 48 06/08/2025     Lab Results   Component Value Date    VWH8KFKJXJDQ 312.897 (H) 05/14/2025    LAV5KQFXYENR 205.000 (H) 05/12/2023    RCA0ZCYPHYTE 34.848 (H) 01/20/2023     Lab Results   Component Value Date    FREET4 <0.25 " (L) 05/14/2025       There are no Patient Instructions on file for this visit.    Discussed with the patient and all questioned fully answered. She will call me if any problems arise.

## 2025-08-02 ENCOUNTER — HOSPITAL ENCOUNTER (OUTPATIENT)
Dept: ULTRASOUND IMAGING | Facility: HOSPITAL | Age: 40
Discharge: HOME | End: 2025-08-02
Payer: COMMERCIAL

## 2025-08-02 DIAGNOSIS — E04.1 THYROID NODULE: ICD-10-CM

## 2025-08-02 PROCEDURE — 76536 US EXAM OF HEAD AND NECK: CPT

## 2025-08-11 ENCOUNTER — TELEPHONE (OUTPATIENT)
Age: 40
End: 2025-08-11

## (undated) DEVICE — TROCAR: Brand: KII FIOS FIRST ENTRY

## (undated) DEVICE — TROCAR: Brand: KII® SLEEVE

## (undated) DEVICE — WOUND RETRACTOR AND PROTECTOR: Brand: ALEXIS O WOUND PROTECTOR-RETRACTOR

## (undated) DEVICE — CHLORAPREP HI-LITE 26ML ORANGE

## (undated) DEVICE — BETHLEHEM UNIVERSAL GYN LAP PK: Brand: CARDINAL HEALTH

## (undated) DEVICE — GLOVE INDICATOR PI UNDERGLOVE SZ 6.5 BLUE

## (undated) DEVICE — SUT VICRYL 0 CT 36 IN J958H

## (undated) DEVICE — PREMIUM DRY TRAY LF: Brand: MEDLINE INDUSTRIES, INC.

## (undated) DEVICE — KIT,BETHLEHEM C SECT DELIVERY: Brand: CARDINAL HEALTH

## (undated) DEVICE — INTENDED FOR TISSUE SEPARATION, AND OTHER PROCEDURES THAT REQUIRE A SHARP SURGICAL BLADE TO PUNCTURE OR CUT.: Brand: BARD-PARKER SAFETY BLADES SIZE 11, STERILE

## (undated) DEVICE — ADHESIVE SKIN HIGH VISCOSITY EXOFIN 1ML

## (undated) DEVICE — MEDI-VAC YANKAUER SUCTION HANDLE W/STRAIGHT TIP & CONTROL VENT: Brand: CARDINAL HEALTH

## (undated) DEVICE — SUT MONOCRYL 4-0 PS-2 27 IN Y426H

## (undated) DEVICE — ENSEAL X1 STRAIGHT 37CM SHAFT: Brand: HARMONIC

## (undated) DEVICE — DRESSING TELFA 2 X 3 IN STRL

## (undated) DEVICE — SUT VICRYL 0 CTX 36 IN J978H

## (undated) DEVICE — SCD SEQUENTIAL COMPRESSION COMFORT SLEEVE MEDIUM KNEE LENGTH: Brand: KENDALL SCD

## (undated) DEVICE — [HIGH FLOW INSUFFLATOR,  DO NOT USE IF PACKAGE IS DAMAGED,  KEEP DRY,  KEEP AWAY FROM SUNLIGHT,  PROTECT FROM HEAT AND RADIOACTIVE SOURCES.]: Brand: PNEUMOSURE

## (undated) DEVICE — 3000CC GUARDIAN II: Brand: GUARDIAN

## (undated) DEVICE — GLOVE PI ULTRA TOUCH SZ.6.5

## (undated) DEVICE — PVC URETHRAL CATHETER: Brand: DOVER

## (undated) DEVICE — SUT STRATAFIX 2-0 RB-1 20CM SXMD1B404

## (undated) DEVICE — DRAPE EQUIPMENT RF WAND

## (undated) DEVICE — BLUE HEAT SCOPE WARMER